# Patient Record
Sex: MALE | Race: WHITE | NOT HISPANIC OR LATINO | Employment: STUDENT | URBAN - METROPOLITAN AREA
[De-identification: names, ages, dates, MRNs, and addresses within clinical notes are randomized per-mention and may not be internally consistent; named-entity substitution may affect disease eponyms.]

---

## 2017-01-06 ENCOUNTER — OFFICE VISIT (OUTPATIENT)
Dept: FAMILY MEDICINE CLINIC | Facility: CLINIC | Age: 22
End: 2017-01-06

## 2017-01-06 VITALS
WEIGHT: 148 LBS | DIASTOLIC BLOOD PRESSURE: 82 MMHG | TEMPERATURE: 99 F | BODY MASS INDEX: 20.72 KG/M2 | HEART RATE: 95 BPM | OXYGEN SATURATION: 96 % | SYSTOLIC BLOOD PRESSURE: 122 MMHG | HEIGHT: 71 IN

## 2017-01-06 DIAGNOSIS — K21.9 GASTROESOPHAGEAL REFLUX DISEASE, ESOPHAGITIS PRESENCE NOT SPECIFIED: Primary | ICD-10-CM

## 2017-01-06 PROCEDURE — 99203 OFFICE O/P NEW LOW 30 MIN: CPT | Performed by: NURSE PRACTITIONER

## 2017-01-06 RX ORDER — OMEPRAZOLE 40 MG/1
40 CAPSULE, DELAYED RELEASE ORAL DAILY
COMMUNITY
End: 2017-01-06

## 2017-01-06 RX ORDER — OMEPRAZOLE 40 MG/1
40 CAPSULE, DELAYED RELEASE ORAL DAILY
Qty: 30 CAPSULE | Refills: 11 | Status: SHIPPED | OUTPATIENT
Start: 2017-01-06 | End: 2018-08-14

## 2017-01-06 NOTE — PROGRESS NOTES
Subjective   Salvatore Breen is a 21 y.o. male.     History of Present Illness   Salvatore Breen 21 y.o. male who presents today for a new patient appointment.    he has a history of   Patient Active Problem List   Diagnosis   • Gastroesophageal reflux disease   .  I reviewed the PFSH recorded today by my MA/LPN staff.   he is here to establish care and discuss heartburn.  Patient states he started to have heartburn specifically after meals about 4-5 months ago. He denies having any issue prior to this.  He denies abdominal pain or change in stool.  He reports symptoms are worse after certain types of food. He has been taking OTC prilosec with only some help with symptoms. States the symptoms are more mild but not controlled.  Will increase dose to 40 mg daily.  He will f/u if symptoms worsen or do not improve on increased dose.  Will then switch and refer to GI.      Reports FH of HTN, diabetes, thyroid disease.  Patient is nonsmoker. No surgical hx.  Patient reports he normally eats pretty healthy except for recently.  He attends college in [place] and is only home for break.    The following portions of the patient's history were reviewed and updated as appropriate: allergies, current medications, past family history, past medical history, past social history, past surgical history and problem list.    Review of Systems   Constitutional: Negative for unexpected weight change.   Gastrointestinal: Negative for abdominal distention, abdominal pain, anal bleeding, blood in stool, constipation, diarrhea, nausea, rectal pain and vomiting.       Objective   Physical Exam   Constitutional: He is oriented to person, place, and time. He appears well-developed and well-nourished.   HENT:   Head: Normocephalic and atraumatic.   Cardiovascular: Normal rate and regular rhythm.    Pulmonary/Chest: Effort normal and breath sounds normal.   Abdominal: Normal appearance and bowel sounds are normal. There is no tenderness. There  is no rigidity, no rebound, no guarding, no tenderness at McBurney's point and negative Neves's sign.   Neurological: He is alert and oriented to person, place, and time.   Skin: Skin is warm and dry.   Psychiatric: He has a normal mood and affect. Judgment normal.   Vitals reviewed.      Assessment/Plan   Salvatore was seen today for heartburn.    Diagnoses and all orders for this visit:    Gastroesophageal reflux disease, esophagitis presence not specified  -     omeprazole (priLOSEC) 40 MG capsule; Take 1 capsule by mouth Daily.

## 2017-01-06 NOTE — MR AVS SNAPSHOT
"                        Salvatorejuanis Gomezley   2017 11:00 AM   Office Visit    Provider:  CANDI Benavides   Department:  [facility]   Dept Phone:  [phone]                Your Full Care Plan              Your Updated Medication List          This list is accurate as of: 17 11:22 AM.  Always use your most recent med list.                omeprazole 40 MG capsule   Commonly known as:  priLOSEC               Instructions     None    Patient Instructions History      [employer] Signup     [facility] [employer] allows you to send messages to your doctor, view your test results, renew your prescriptions, schedule appointments, and more. To sign up, go to [employer] and click on the Sign Up Now link in the New User? box. Enter your [employer] Activation Code exactly as it appears below along with the last four digits of your Social Security Number and your Date of Birth () to complete the sign-up process. If you do not sign up before the expiration date, you must request a new code.    Tra[employer] Activation Code: [email or web address]  Expires: 2017 11:22 AM    If you have questions, you can email [email or web address] or call [phone] to talk to our [employer] staff. Remember, [employer] is NOT to be used for urgent needs. For medical emergencies, dial 911.               Other Info from Your Visit           Allergies     No Known Allergies      Reason for Visit     Heartburn establish care,  after meals, 4-5 mo      Vital Signs     Blood Pressure Pulse Temperature Height Weight Oxygen Saturation    122/82 95 99 °F (37.2 °C) 71\" (180.3 cm) 148 lb (67.1 kg) 96%    Body Mass Index Smoking Status                20.64 kg/m2 Never Smoker          "

## 2018-08-14 ENCOUNTER — OFFICE VISIT (OUTPATIENT)
Dept: FAMILY MEDICINE CLINIC | Facility: CLINIC | Age: 23
End: 2018-08-14

## 2018-08-14 VITALS
TEMPERATURE: 98.3 F | HEIGHT: 71 IN | RESPIRATION RATE: 16 BRPM | DIASTOLIC BLOOD PRESSURE: 82 MMHG | WEIGHT: 152 LBS | OXYGEN SATURATION: 98 % | BODY MASS INDEX: 21.28 KG/M2 | SYSTOLIC BLOOD PRESSURE: 124 MMHG | HEART RATE: 74 BPM

## 2018-08-14 DIAGNOSIS — Z00.00 WELLNESS EXAMINATION: Primary | ICD-10-CM

## 2018-08-14 PROBLEM — F41.9 ANXIETY: Status: ACTIVE | Noted: 2018-08-14

## 2018-08-14 PROBLEM — F32.A DEPRESSION: Status: ACTIVE | Noted: 2018-08-14

## 2018-08-14 PROCEDURE — 99395 PREV VISIT EST AGE 18-39: CPT | Performed by: NURSE PRACTITIONER

## 2018-08-14 RX ORDER — DULOXETIN HYDROCHLORIDE 30 MG/1
CAPSULE, DELAYED RELEASE ORAL
COMMUNITY
Start: 2018-08-13 | End: 2019-02-27

## 2018-08-14 RX ORDER — DULOXETIN HYDROCHLORIDE 60 MG/1
CAPSULE, DELAYED RELEASE ORAL
COMMUNITY
Start: 2018-08-13 | End: 2019-02-27

## 2018-08-14 NOTE — PROGRESS NOTES
Subjective   Salvatore Breen is a 23 y.o. male.     History of Present Illness   Salvatore Breen 23 y.o. male who presents for a Wellness Visit.  he has a history of   Patient Active Problem List   Diagnosis   • Gastroesophageal reflux disease   • Anxiety   • Depression   .  he has been feeling well.        Due for labs and has been fasting today.  Denies any interim issues.  Is seeing psychiatry for treatment of anxiety and depression.  He has been taking 90 mg of Cymbalta for over a year.  He reports some stomach ache in the mornings and trouble sleeping.  He takes medication at night.      The following portions of the patient's history were reviewed and updated as appropriate: allergies, current medications, past family history, past medical history, past social history, past surgical history and problem list.    Review of Systems   Constitutional: Negative for fatigue.   HENT: Negative for congestion.    Eyes: Negative for visual disturbance.   Respiratory: Negative for cough and shortness of breath.    Cardiovascular: Negative for chest pain and palpitations.   Endocrine: Negative for cold intolerance, heat intolerance, polydipsia, polyphagia and polyuria.   Genitourinary: Negative for difficulty urinating.   Musculoskeletal: Negative for back pain.   Skin: Negative for rash.   Neurological: Negative for dizziness and headaches.   Psychiatric/Behavioral: Negative for dysphoric mood and sleep disturbance. The patient is not nervous/anxious.        Objective   Physical Exam   Constitutional: He is oriented to person, place, and time. He appears well-developed and well-nourished.   HENT:   Right Ear: Tympanic membrane, external ear and ear canal normal.   Left Ear: Tympanic membrane, external ear and ear canal normal.   Nose: No mucosal edema or rhinorrhea. Right sinus exhibits no maxillary sinus tenderness and no frontal sinus tenderness. Left sinus exhibits no maxillary sinus tenderness and no frontal sinus  tenderness.   Mouth/Throat: Uvula is midline and oropharynx is clear and moist.   Cardiovascular: Normal rate and regular rhythm.    Pulmonary/Chest: Effort normal and breath sounds normal.   Neurological: He is oriented to person, place, and time.   Skin: Skin is warm and dry.   Psychiatric: He has a normal mood and affect. His behavior is normal. Judgment and thought content normal.   Nursing note and vitals reviewed.      Assessment/Plan   Salvatore was seen today for general wellness exam.    Diagnoses and all orders for this visit:    Wellness examination  -     Comprehensive metabolic panel  -     Lipid panel  -     CBC and Differential  -     TSH      Patient to switch cymbalta to mornings and take with breakfast. He will f/u with psych if still has issues.

## 2018-08-15 LAB
ALBUMIN SERPL-MCNC: 4.7 G/DL (ref 3.5–5.2)
ALBUMIN/GLOB SERPL: 2.1 G/DL
ALP SERPL-CCNC: 55 U/L (ref 39–117)
ALT SERPL-CCNC: 35 U/L (ref 1–41)
AST SERPL-CCNC: 20 U/L (ref 1–40)
BASOPHILS # BLD AUTO: 0.05 10*3/MM3 (ref 0–0.2)
BASOPHILS NFR BLD AUTO: 0.7 % (ref 0–1.5)
BILIRUB SERPL-MCNC: 0.3 MG/DL (ref 0.1–1.2)
BUN SERPL-MCNC: 9 MG/DL (ref 6–20)
BUN/CREAT SERPL: 9.8 (ref 7–25)
CALCIUM SERPL-MCNC: 9.7 MG/DL (ref 8.6–10.5)
CHLORIDE SERPL-SCNC: 102 MMOL/L (ref 98–107)
CHOLEST SERPL-MCNC: 198 MG/DL (ref 0–200)
CO2 SERPL-SCNC: 28.3 MMOL/L (ref 22–29)
CREAT SERPL-MCNC: 0.92 MG/DL (ref 0.76–1.27)
EOSINOPHIL # BLD AUTO: 0.23 10*3/MM3 (ref 0–0.7)
EOSINOPHIL NFR BLD AUTO: 3.2 % (ref 0.3–6.2)
ERYTHROCYTE [DISTWIDTH] IN BLOOD BY AUTOMATED COUNT: 13.3 % (ref 11.5–14.5)
GLOBULIN SER CALC-MCNC: 2.2 GM/DL
GLUCOSE SERPL-MCNC: 93 MG/DL (ref 65–99)
HCT VFR BLD AUTO: 48.1 % (ref 40.4–52.2)
HDLC SERPL-MCNC: 64 MG/DL (ref 40–60)
HGB BLD-MCNC: 14.9 G/DL (ref 13.7–17.6)
IMM GRANULOCYTES # BLD: 0.06 10*3/MM3 (ref 0–0.03)
IMM GRANULOCYTES NFR BLD: 0.8 % (ref 0–0.5)
LDLC SERPL CALC-MCNC: 107 MG/DL (ref 0–100)
LYMPHOCYTES # BLD AUTO: 1.79 10*3/MM3 (ref 0.9–4.8)
LYMPHOCYTES NFR BLD AUTO: 24.8 % (ref 19.6–45.3)
MCH RBC QN AUTO: 30.7 PG (ref 27–32.7)
MCHC RBC AUTO-ENTMCNC: 31 G/DL (ref 32.6–36.4)
MCV RBC AUTO: 99.2 FL (ref 79.8–96.2)
MONOCYTES # BLD AUTO: 0.55 10*3/MM3 (ref 0.2–1.2)
MONOCYTES NFR BLD AUTO: 7.6 % (ref 5–12)
NEUTROPHILS # BLD AUTO: 4.54 10*3/MM3 (ref 1.9–8.1)
NEUTROPHILS NFR BLD AUTO: 62.9 % (ref 42.7–76)
PLATELET # BLD AUTO: 256 10*3/MM3 (ref 140–500)
POTASSIUM SERPL-SCNC: 4.6 MMOL/L (ref 3.5–5.2)
PROT SERPL-MCNC: 6.9 G/DL (ref 6–8.5)
RBC # BLD AUTO: 4.85 10*6/MM3 (ref 4.6–6)
SODIUM SERPL-SCNC: 143 MMOL/L (ref 136–145)
TRIGL SERPL-MCNC: 136 MG/DL (ref 0–150)
TSH SERPL DL<=0.005 MIU/L-ACNC: 1.54 MIU/ML (ref 0.27–4.2)
VLDLC SERPL CALC-MCNC: 27.2 MG/DL (ref 5–40)
WBC # BLD AUTO: 7.22 10*3/MM3 (ref 4.5–10.7)

## 2018-10-08 ENCOUNTER — OFFICE VISIT (OUTPATIENT)
Dept: FAMILY MEDICINE CLINIC | Facility: CLINIC | Age: 23
End: 2018-10-08

## 2018-10-08 VITALS
SYSTOLIC BLOOD PRESSURE: 134 MMHG | HEIGHT: 71 IN | WEIGHT: 159 LBS | BODY MASS INDEX: 22.26 KG/M2 | DIASTOLIC BLOOD PRESSURE: 81 MMHG | TEMPERATURE: 98 F | HEART RATE: 94 BPM | RESPIRATION RATE: 16 BRPM | OXYGEN SATURATION: 98 %

## 2018-10-08 DIAGNOSIS — K29.00 OTHER ACUTE GASTRITIS WITHOUT HEMORRHAGE: Primary | ICD-10-CM

## 2018-10-08 PROCEDURE — 99213 OFFICE O/P EST LOW 20 MIN: CPT | Performed by: NURSE PRACTITIONER

## 2018-10-08 RX ORDER — OMEPRAZOLE 40 MG/1
40 CAPSULE, DELAYED RELEASE ORAL DAILY
Qty: 30 CAPSULE | Refills: 5 | Status: SHIPPED | OUTPATIENT
Start: 2018-10-08 | End: 2018-10-11

## 2018-10-08 NOTE — PROGRESS NOTES
Subjective   Salvatore Breen is a 23 y.o. male.     History of Present Illness   Salvatore Breen 23 y.o. male who presents for evaluation of nausea and abdominal pain. Symptoms have been present for a few weeks .  The condition is aggravated by nothing . he is experiencing GERD.  Alleviating factors are mylanta with temporary relief . Patient denies fever, chills, diarrhea, constipation, change in stools, GI symptoms waking them up, dyschezia, melena, bright red blood in stool, fatty food intolerance, dysphagia, vomiting, recently taking antibiotics, RUQ abdominal pain and pain referring to the back his past medical history is notable for GERD.  Patient denies recent travel.        The following portions of the patient's history were reviewed and updated as appropriate: allergies, current medications, past family history, past medical history, past social history, past surgical history and problem list.    Review of Systems   Constitutional: Negative for chills and fever.   Gastrointestinal: Positive for abdominal pain and nausea. Negative for abdominal distention, anal bleeding, blood in stool, constipation, diarrhea, rectal pain and vomiting.   Genitourinary: Negative for dysuria, frequency and hematuria.   Musculoskeletal: Negative for back pain.       Objective   Physical Exam   Constitutional: He is oriented to person, place, and time. He appears well-developed and well-nourished.   Cardiovascular: Normal rate and regular rhythm.    Pulmonary/Chest: Effort normal and breath sounds normal.   Abdominal: Normal appearance and bowel sounds are normal. There is tenderness in the epigastric area.       Neurological: He is oriented to person, place, and time.   Skin: Skin is warm and dry.   Psychiatric: He has a normal mood and affect. His behavior is normal. Judgment and thought content normal.   Nursing note and vitals reviewed.      Assessment/Plan   Salvatore was seen today for gi problem.    Diagnoses and all orders  for this visit:    Other acute gastritis without hemorrhage  -     omeprazole (PRILOSEC) 40 MG capsule; Take 1 capsule by mouth Daily.        Discussed treatment with PPI for 8 weeks. Avoid NSAIDs and spicy or fatty foods.

## 2018-10-09 ENCOUNTER — TELEPHONE (OUTPATIENT)
Dept: FAMILY MEDICINE CLINIC | Facility: CLINIC | Age: 23
End: 2018-10-09

## 2018-10-09 DIAGNOSIS — K21.9 GASTROESOPHAGEAL REFLUX DISEASE, ESOPHAGITIS PRESENCE NOT SPECIFIED: Primary | ICD-10-CM

## 2018-10-11 RX ORDER — PANTOPRAZOLE SODIUM 40 MG/1
40 TABLET, DELAYED RELEASE ORAL DAILY
Qty: 90 TABLET | Refills: 0 | Status: SHIPPED | OUTPATIENT
Start: 2018-10-11 | End: 2019-02-22 | Stop reason: SDUPTHER

## 2018-10-16 ENCOUNTER — OFFICE VISIT (OUTPATIENT)
Dept: GASTROENTEROLOGY | Facility: CLINIC | Age: 23
End: 2018-10-16

## 2018-10-16 VITALS
TEMPERATURE: 99.8 F | BODY MASS INDEX: 22.34 KG/M2 | WEIGHT: 159.6 LBS | DIASTOLIC BLOOD PRESSURE: 66 MMHG | HEIGHT: 71 IN | SYSTOLIC BLOOD PRESSURE: 114 MMHG

## 2018-10-16 DIAGNOSIS — R10.13 EPIGASTRIC PAIN: ICD-10-CM

## 2018-10-16 DIAGNOSIS — R12 HEARTBURN: Primary | ICD-10-CM

## 2018-10-16 PROCEDURE — 99203 OFFICE O/P NEW LOW 30 MIN: CPT | Performed by: INTERNAL MEDICINE

## 2018-10-16 NOTE — PROGRESS NOTES
Chief Complaint   Patient presents with   • Heartburn   • Abdominal Pain     Salvatore Breen is a 23 y.o. male who presents with heartburn, indigestion, sour stomach.  This started a year ago.  He tried a PPI - prilosec - caused dizziness.  Better on pantoprazole - still has some issues in the morning.  He wakes up with heartburn.  He sometimes wakes up in the middle the night with symptoms.  He has never really has daytime symptoms.  He has been on Cymbalta for several months and this is really help with his anxiety and is also help with his GI symptoms.  He had the acid reflux symptoms prior to starting the Cymbalta.  He denies any weight loss, dysphagia or blood in his stool.  He notes that since he moved home his weight is actually increased which she attributes to eating more regular healthy meals.  HPI  Past Medical History:   Diagnosis Date   • GERD (gastroesophageal reflux disease)      History reviewed. No pertinent surgical history.    Current Outpatient Prescriptions:   •  DULoxetine (CYMBALTA) 30 MG capsule, , Disp: , Rfl:   •  DULoxetine (CYMBALTA) 60 MG capsule, , Disp: , Rfl:   •  pantoprazole (PROTONIX) 40 MG EC tablet, Take 1 tablet by mouth Daily., Disp: 90 tablet, Rfl: 0  No Known Allergies  Social History     Social History   • Marital status: Single     Spouse name: N/A   • Number of children: N/A   • Years of education: N/A     Occupational History   • Not on file.     Social History Main Topics   • Smoking status: Never Smoker   • Smokeless tobacco: Never Used   • Alcohol use Yes      Comment: social   • Drug use: Yes     Types: Marijuana   • Sexual activity: Not on file     Other Topics Concern   • Not on file     Social History Narrative   • No narrative on file     Family History   Problem Relation Age of Onset   • Hypertension Mother    • Diabetes Mother    • Thyroid disease Mother    • Hypertension Father    • Alcohol abuse Father    • Lung disease Maternal Grandfather    • Alcohol abuse  Paternal Grandfather    • Liver disease Maternal Aunt    • Liver cancer Maternal Aunt      Review of Systems   Constitutional: Negative for appetite change and unexpected weight change.   HENT: Negative for trouble swallowing.    Gastrointestinal: Positive for abdominal pain. Negative for nausea and vomiting.   All other systems reviewed and are negative.    Vitals:    10/16/18 1427   BP: 114/66   Temp: 99.8 °F (37.7 °C)     1    10/16/18  1427   Weight: 72.4 kg (159 lb 9.6 oz)     Physical Exam   Constitutional: He appears well-developed and well-nourished.   HENT:   Head: Normocephalic and atraumatic.   Eyes: No scleral icterus.   Abdominal: Soft. He exhibits no distension and no mass. There is tenderness.   Epigastric tenderness   Neurological: He is alert.   Skin: Skin is warm and dry.   Psychiatric: He has a normal mood and affect.     No images are attached to the encounter.  No notes on file  Salvatore was seen today for heartburn and abdominal pain.    Diagnoses and all orders for this visit:    Heartburn    Epigastric pain    Plan-  Recommend changing his pantoprazole to evening dosing-take 30 minutes prior to the evening meal    We discussed like*modification to decrease nighttime heartburn symptoms including watching meal size, keeping her evening meal 3-4 hours from bedtime.      Okay to use when necessary H2 blockers for breakthrough symptoms    I do not think his Cymbalta is causing his symptoms as they seem to have preceded the use of this medicine-seems to be helping with his anxiety and overall he feels better with the medicine.    He does have some mild epigastric pain on exam.  If this persists at follow-up we will evaluate with EGD

## 2019-01-10 ENCOUNTER — OFFICE VISIT (OUTPATIENT)
Dept: GASTROENTEROLOGY | Facility: CLINIC | Age: 24
End: 2019-01-10

## 2019-01-10 VITALS
WEIGHT: 171.4 LBS | HEIGHT: 70 IN | TEMPERATURE: 98.1 F | SYSTOLIC BLOOD PRESSURE: 126 MMHG | DIASTOLIC BLOOD PRESSURE: 90 MMHG | BODY MASS INDEX: 24.54 KG/M2

## 2019-01-10 DIAGNOSIS — R10.13 EPIGASTRIC PAIN: ICD-10-CM

## 2019-01-10 DIAGNOSIS — K21.9 GASTROESOPHAGEAL REFLUX DISEASE, ESOPHAGITIS PRESENCE NOT SPECIFIED: Primary | ICD-10-CM

## 2019-01-10 PROCEDURE — 99214 OFFICE O/P EST MOD 30 MIN: CPT | Performed by: NURSE PRACTITIONER

## 2019-01-10 RX ORDER — BUPROPION HCL 150 MG
TABLET, EXTENDED RELEASE 24 HR ORAL
Refills: 0 | COMMUNITY
Start: 2018-12-29 | End: 2019-05-23 | Stop reason: ALTCHOICE

## 2019-01-10 RX ORDER — FLUOXETINE 20 MG/1
TABLET, FILM COATED ORAL
Refills: 1 | COMMUNITY
Start: 2019-01-02 | End: 2019-06-05 | Stop reason: SDUPTHER

## 2019-01-10 NOTE — PROGRESS NOTES
Chief Complaint   Patient presents with   • Follow-up   • Heartburn       Salvatore Brene is a  23 y.o. male here for a follow up visit for  GERD.    HPI  24 yo m presents today for follow up visit for GERD. He is a patient of Dr. Kingston. He was last seen in the office on 10/2018. He has hx GERD with epigastric pain and admits the protonix 40 mg PM and Zantac OTC in AM is helping his reflux symptoms. He would like to have an EGD done to see why he is requiring these meds. He denies any dysphagia, N&V,diarrhea, constipation, rectal bleeding or melena. He admits his appetite is good and his weight is stable. He has never had an EGD done in the past.       Past Medical History:   Diagnosis Date   • GERD (gastroesophageal reflux disease)        History reviewed. No pertinent surgical history.    Scheduled Meds:    Continuous Infusions:  No current facility-administered medications for this visit.     PRN Meds:.    No Known Allergies    Social History     Socioeconomic History   • Marital status: Single     Spouse name: Not on file   • Number of children: Not on file   • Years of education: Not on file   • Highest education level: Not on file   Social Needs   • Financial resource strain: Not on file   • Food insecurity - worry: Not on file   • Food insecurity - inability: Not on file   • Transportation needs - medical: Not on file   • Transportation needs - non-medical: Not on file   Occupational History   • Not on file   Tobacco Use   • Smoking status: Never Smoker   • Smokeless tobacco: Never Used   Substance and Sexual Activity   • Alcohol use: Yes     Comment: social   • Drug use: Yes     Types: Marijuana     Comment: social    • Sexual activity: Not on file   Other Topics Concern   • Not on file   Social History Narrative   • Not on file       Family History   Problem Relation Age of Onset   • Hypertension Mother    • Diabetes Mother    • Thyroid disease Mother    • Hypertension Father    • Alcohol abuse Father    •  Lung disease Maternal Grandfather    • Alcohol abuse Paternal Grandfather    • Liver disease Maternal Aunt    • Liver cancer Maternal Aunt        Review of Systems   Constitutional: Negative for appetite change, chills, diaphoresis, fatigue, fever and unexpected weight change.   HENT: Negative for nosebleeds, postnasal drip, sore throat, trouble swallowing and voice change.    Respiratory: Negative for cough, choking, chest tightness, shortness of breath and wheezing.    Cardiovascular: Negative for chest pain.   Gastrointestinal: Negative for abdominal distention, abdominal pain, anal bleeding, blood in stool, constipation, diarrhea, nausea, rectal pain and vomiting.   Endocrine: Negative for polydipsia, polyphagia and polyuria.   Musculoskeletal: Negative for gait problem.   Skin: Negative for rash and wound.   Allergic/Immunologic: Negative for food allergies.   Neurological: Negative for dizziness, speech difficulty and light-headedness.   Psychiatric/Behavioral: Negative for confusion, self-injury, sleep disturbance and suicidal ideas.       Vitals:    01/10/19 1448   BP: 126/90   Temp: 98.1 °F (36.7 °C)       Physical Exam   Constitutional: He is oriented to person, place, and time. He appears well-developed and well-nourished. He does not appear ill. No distress.   HENT:   Head: Normocephalic.   Eyes: Pupils are equal, round, and reactive to light.   Cardiovascular: Normal rate, regular rhythm and normal heart sounds.   Pulmonary/Chest: Effort normal and breath sounds normal.   Abdominal: Soft. Bowel sounds are normal. He exhibits no distension and no mass. There is no hepatosplenomegaly. There is no tenderness. There is no rebound and no guarding. No hernia.   Musculoskeletal: Normal range of motion.   Neurological: He is alert and oriented to person, place, and time.   Skin: Skin is warm and dry.   Psychiatric: He has a normal mood and affect. His speech is normal and behavior is normal. Judgment normal.        No images are attached to the encounter.    Assessment & Plan     1. Gastroesophageal reflux disease, esophagitis presence not specified  - Case Request; Standing  - Case Request    2. Epigastric pain  - Case Request; Standing  - Case Request    GERD is much improved with Protonix and Zantac but not 100%. Given hx will go ahead and recommend EGD with Dr. Kingston for further evaluation. Patient is agreeable. Continue same meds for now. Call office with any issues.

## 2019-01-25 ENCOUNTER — OUTSIDE FACILITY SERVICE (OUTPATIENT)
Dept: GASTROENTEROLOGY | Facility: CLINIC | Age: 24
End: 2019-01-25

## 2019-01-25 PROCEDURE — 43239 EGD BIOPSY SINGLE/MULTIPLE: CPT | Performed by: INTERNAL MEDICINE

## 2019-02-04 ENCOUNTER — TELEPHONE (OUTPATIENT)
Dept: GASTROENTEROLOGY | Facility: CLINIC | Age: 24
End: 2019-02-04

## 2019-02-04 NOTE — TELEPHONE ENCOUNTER
Mild chronic inflammation was seen on gastric biopsies.  No additional medication as needed for this.  Continue diet modification to prevent acid reflux and current medications.  Follow up in the office as scheduled.

## 2019-02-06 NOTE — TELEPHONE ENCOUNTER
Called pt and advised per Dr Kingston that he had mild chronic inflammation was seen on gastric bx.  No additions medication as need for this.  Continue diet modification  To prevent acid reflux and current meds.  F/u as scheduled. Pt verb undestanding.

## 2019-02-21 DIAGNOSIS — K21.9 GASTROESOPHAGEAL REFLUX DISEASE, ESOPHAGITIS PRESENCE NOT SPECIFIED: ICD-10-CM

## 2019-02-22 DIAGNOSIS — K21.9 GASTROESOPHAGEAL REFLUX DISEASE, ESOPHAGITIS PRESENCE NOT SPECIFIED: ICD-10-CM

## 2019-02-22 RX ORDER — PANTOPRAZOLE SODIUM 40 MG/1
40 TABLET, DELAYED RELEASE ORAL DAILY
Qty: 30 TABLET | Refills: 0 | Status: SHIPPED | OUTPATIENT
Start: 2019-02-22 | End: 2019-02-27 | Stop reason: SDUPTHER

## 2019-02-22 RX ORDER — PANTOPRAZOLE SODIUM 40 MG/1
40 TABLET, DELAYED RELEASE ORAL DAILY
Qty: 90 TABLET | Refills: 0 | OUTPATIENT
Start: 2019-02-22

## 2019-02-25 RX ORDER — PANTOPRAZOLE SODIUM 40 MG/1
40 TABLET, DELAYED RELEASE ORAL DAILY
Qty: 90 TABLET | Refills: 0 | OUTPATIENT
Start: 2019-02-25

## 2019-02-27 ENCOUNTER — OFFICE VISIT (OUTPATIENT)
Dept: FAMILY MEDICINE CLINIC | Facility: CLINIC | Age: 24
End: 2019-02-27

## 2019-02-27 VITALS
SYSTOLIC BLOOD PRESSURE: 133 MMHG | DIASTOLIC BLOOD PRESSURE: 75 MMHG | TEMPERATURE: 98.2 F | HEART RATE: 71 BPM | RESPIRATION RATE: 16 BRPM | OXYGEN SATURATION: 99 % | WEIGHT: 175 LBS | BODY MASS INDEX: 25.05 KG/M2 | HEIGHT: 70 IN

## 2019-02-27 DIAGNOSIS — K21.9 GASTROESOPHAGEAL REFLUX DISEASE, ESOPHAGITIS PRESENCE NOT SPECIFIED: ICD-10-CM

## 2019-02-27 DIAGNOSIS — K29.50 CHRONIC GASTRITIS WITHOUT BLEEDING, UNSPECIFIED GASTRITIS TYPE: Primary | ICD-10-CM

## 2019-02-27 PROCEDURE — 99213 OFFICE O/P EST LOW 20 MIN: CPT | Performed by: NURSE PRACTITIONER

## 2019-02-27 RX ORDER — TRAZODONE HYDROCHLORIDE 50 MG/1
TABLET ORAL
Refills: 3 | COMMUNITY
Start: 2019-01-07 | End: 2020-07-31

## 2019-02-27 RX ORDER — PANTOPRAZOLE SODIUM 40 MG/1
40 TABLET, DELAYED RELEASE ORAL DAILY
Qty: 30 TABLET | Refills: 5 | Status: SHIPPED | OUTPATIENT
Start: 2019-02-27 | End: 2019-07-29 | Stop reason: SDUPTHER

## 2019-02-27 RX ORDER — PRAZOSIN HYDROCHLORIDE 1 MG/1
CAPSULE ORAL
Refills: 0 | COMMUNITY
Start: 2018-11-30 | End: 2019-07-29

## 2019-02-27 NOTE — PROGRESS NOTES
Subjective   Salvatore Breen is a 24 y.o. male.     History of Present Illness   Patient presents to office to f/u on EGD and gastritis.  EGD results discussed with him.  He reports symptoms have improved significantly. He is taking the pantoprazole in the evening and ranitidine in the morning.  He is avoiding offending foods as discussed.    The following portions of the patient's history were reviewed and updated as appropriate: allergies, current medications, past family history, past medical history, past social history, past surgical history and problem list.    Review of Systems   Constitutional: Negative for activity change, appetite change, fatigue and unexpected weight change.   Respiratory: Negative for cough and shortness of breath.    Cardiovascular: Negative for chest pain and palpitations.   Gastrointestinal: Negative for abdominal distention, abdominal pain, anal bleeding, blood in stool, constipation, diarrhea, nausea, rectal pain and vomiting.   Skin: Negative for rash.   Psychiatric/Behavioral: Negative for dysphoric mood and sleep disturbance. The patient is not nervous/anxious.        Objective   Physical Exam   Constitutional: He is oriented to person, place, and time. He appears well-developed and well-nourished.   Pulmonary/Chest: Effort normal.   Neurological: He is oriented to person, place, and time.   Skin: Skin is warm and dry.   Psychiatric: He has a normal mood and affect. His behavior is normal. Judgment and thought content normal.   Nursing note and vitals reviewed.      Assessment/Plan   Salvatore was seen today for follow-up.    Diagnoses and all orders for this visit:    Chronic gastritis without bleeding, unspecified gastritis type  -     pantoprazole (PROTONIX) 40 MG EC tablet; Take 1 tablet by mouth Daily.    Gastroesophageal reflux disease, esophagitis presence not specified  -     pantoprazole (PROTONIX) 40 MG EC tablet; Take 1 tablet by mouth Daily.      He will stop ranitidine  when he feels that symptoms are controlled.   He will continue pantoprazole and f/u in 6 months.  Will then wean him to 20 mg dose or change to ranitidine BID.

## 2019-03-18 ENCOUNTER — OFFICE VISIT (OUTPATIENT)
Dept: RETAIL CLINIC | Facility: CLINIC | Age: 24
End: 2019-03-18

## 2019-03-18 VITALS
OXYGEN SATURATION: 98 % | RESPIRATION RATE: 18 BRPM | DIASTOLIC BLOOD PRESSURE: 78 MMHG | SYSTOLIC BLOOD PRESSURE: 130 MMHG | HEART RATE: 76 BPM | TEMPERATURE: 97.8 F

## 2019-03-18 DIAGNOSIS — J02.9 SORE THROAT: Primary | ICD-10-CM

## 2019-03-18 LAB
EXPIRATION DATE: NORMAL
INTERNAL CONTROL: NORMAL
Lab: NORMAL
S PYO AG THROAT QL: NEGATIVE

## 2019-03-18 PROCEDURE — 99213 OFFICE O/P EST LOW 20 MIN: CPT | Performed by: NURSE PRACTITIONER

## 2019-03-18 PROCEDURE — 87880 STREP A ASSAY W/OPTIC: CPT | Performed by: NURSE PRACTITIONER

## 2019-03-18 NOTE — PROGRESS NOTES
Subjective:     Salvatore Breen is a 24 y.o.     Sore Throat    The current episode started today. There has been no fever. Associated symptoms include trouble swallowing. Pertinent negatives include no congestion, coughing, ear pain, headaches or neck pain. He has had exposure to strep. He has tried nothing for the symptoms.         The following portions of the patient's history were reviewed and updated as appropriate: allergies, current medications, past family history, past medical history, past social history, past surgical history and problem list.      Review of Systems   HENT: Positive for sore throat and trouble swallowing. Negative for congestion and ear pain.    Respiratory: Negative for cough.    Cardiovascular: Negative.    Musculoskeletal: Negative for neck pain.   Neurological: Negative for headaches.         Objective:      Physical Exam   HENT:   Head: Normocephalic and atraumatic.   Right Ear: Tympanic membrane and ear canal normal.   Left Ear: Tympanic membrane and ear canal normal.   Nose: Nose normal.   Mouth/Throat: Posterior oropharyngeal erythema present. No oropharyngeal exudate.   Cardiovascular: Normal rate, regular rhythm, S1 normal, S2 normal and normal heart sounds.   Pulmonary/Chest: Effort normal and breath sounds normal.   Lymphadenopathy:     He has cervical adenopathy (mild).   Vitals reviewed.          Diagnoses and all orders for this visit:    Sore throat  -     POC Rapid Strep A  -     Beta Strep Culture, Throat - Swab, Throat

## 2019-03-18 NOTE — PATIENT INSTRUCTIONS
Pharyngitis  Pharyngitis is redness, pain, and swelling (inflammation) of the throat (pharynx). It is a very common cause of sore throat. Pharyngitis can be caused by a bacteria, but it is usually caused by a virus. Most cases of pharyngitis get better on their own without treatment.  What are the causes?  This condition may be caused by:  · Infection by viruses (viral). Viral pharyngitis spreads from person to person (is contagious) through coughing, sneezing, and sharing of personal items or utensils such as cups, forks, spoons, and toothbrushes.  · Infection by bacteria (bacterial). Bacterial pharyngitis may be spread by touching the nose or face after coming in contact with the bacteria, or through more intimate contact, such as kissing.  · Allergies. Allergies can cause buildup of mucus in the throat (post-nasal drip), leading to inflammation and irritation. Allergies can also cause blocked nasal passages, forcing breathing through the mouth, which dries and irritates the throat.    What increases the risk?  You are more likely to develop this condition if:  · You are 5-24 years old.  · You are exposed to crowded environments such as , school, or dormitory living.  · You live in a cold climate.  · You have a weakened disease-fighting (immune) system.    What are the signs or symptoms?  Symptoms of this condition vary by the cause (viral, bacterial, or allergies) and can include:  · Sore throat.  · Fatigue.  · Low-grade fever.  · Headache.  · Joint pain and muscle aches.  · Skin rashes.  · Swollen glands in the throat (lymph nodes).  · Plaque-like film on the throat or tonsils. This is often a symptom of bacterial pharyngitis.  · Vomiting.  · Stuffy nose (nasal congestion).  · Cough.  · Red, itchy eyes (conjunctivitis).  · Loss of appetite.    How is this diagnosed?  This condition is often diagnosed based on your medical history and a physical exam. Your health care provider will ask you questions about  your illness and your symptoms. A swab of your throat may be done to check for bacteria (rapid strep test). Other lab tests may also be done, depending on the suspected cause, but these are rare.  How is this treated?  This condition usually gets better in 3-4 days without medicine. Bacterial pharyngitis may be treated with antibiotic medicines.  Follow these instructions at home:  · Take over-the-counter and prescription medicines only as told by your health care provider.  ? If you were prescribed an antibiotic medicine, take it as told by your health care provider. Do not stop taking the antibiotic even if you start to feel better.  ? Do not give children aspirin because of the association with Reye syndrome.  · Drink enough water and fluids to keep your urine clear or pale yellow.  · Get a lot of rest.  · Gargle with a salt-water mixture 3-4 times a day or as needed. To make a salt-water mixture, completely dissolve ½-1 tsp of salt in 1 cup of warm water.  · If your health care provider approves, you may use throat lozenges or sprays to soothe your throat.  Contact a health care provider if:  · You have large, tender lumps in your neck.  · You have a rash.  · You cough up green, yellow-brown, or bloody spit.  Get help right away if:  · Your neck becomes stiff.  · You drool or are unable to swallow liquids.  · You cannot drink or take medicines without vomiting.  · You have severe pain that does not go away, even after you take medicine.  · You have trouble breathing, and it is not caused by a stuffy nose.  · You have new pain and swelling in your joints such as the knees, ankles, wrists, or elbows.  Summary  · Pharyngitis is redness, pain, and swelling (inflammation) of the throat (pharynx).  · While pharyngitis can be caused by a bacteria, the most common causes are viral.  · Most cases of pharyngitis get better on their own without treatment.  · Bacterial pharyngitis is treated with antibiotic medicines.  This  information is not intended to replace advice given to you by your health care provider. Make sure you discuss any questions you have with your health care provider.  Document Released: 12/18/2006 Document Revised: 01/23/2018 Document Reviewed: 01/23/2018  [employer] Interactive Patient Education © 2019 [employer]

## 2019-03-22 ENCOUNTER — TELEPHONE (OUTPATIENT)
Dept: RETAIL CLINIC | Facility: CLINIC | Age: 24
End: 2019-03-22

## 2019-03-22 LAB — S PYO THROAT QL CULT: NEGATIVE

## 2019-03-22 NOTE — TELEPHONE ENCOUNTER
Strep culture back and negative. Attempt to reach patient by phone. No answer. LMOR to return call. Results to be conveyed when patient is reached.

## 2019-05-23 ENCOUNTER — OFFICE VISIT (OUTPATIENT)
Dept: FAMILY MEDICINE CLINIC | Facility: CLINIC | Age: 24
End: 2019-05-23

## 2019-05-23 VITALS
OXYGEN SATURATION: 99 % | RESPIRATION RATE: 16 BRPM | WEIGHT: 174 LBS | DIASTOLIC BLOOD PRESSURE: 86 MMHG | HEIGHT: 70 IN | SYSTOLIC BLOOD PRESSURE: 135 MMHG | HEART RATE: 62 BPM | BODY MASS INDEX: 24.91 KG/M2

## 2019-05-23 DIAGNOSIS — K21.9 GASTROESOPHAGEAL REFLUX DISEASE WITHOUT ESOPHAGITIS: Primary | ICD-10-CM

## 2019-05-23 PROCEDURE — 99212 OFFICE O/P EST SF 10 MIN: CPT | Performed by: NURSE PRACTITIONER

## 2019-05-23 RX ORDER — BUPROPION HYDROCHLORIDE 300 MG/1
TABLET ORAL
Refills: 2 | COMMUNITY
Start: 2019-05-08 | End: 2021-03-24 | Stop reason: DRUGHIGH

## 2019-05-23 NOTE — PROGRESS NOTES
Subjective   Salvatore Breen is a 24 y.o. male.     History of Present Illness   Patient presents to office for f/u on gastritis and GERD.  He has been taking pantoprazole since last visit but is no longer taking the ranitidine.  He states he has not had any abdominal pain, nausea or heartburn.  He feels well and will continue on medication.  He still has a few months left of medication.   The following portions of the patient's history were reviewed and updated as appropriate: allergies, current medications, past family history, past medical history, past social history, past surgical history and problem list.    Review of Systems   Constitutional: Negative for fatigue.   Respiratory: Negative for cough and shortness of breath.    Cardiovascular: Negative for chest pain and palpitations.   Gastrointestinal: Negative for abdominal distention, abdominal pain, anal bleeding, blood in stool, constipation, diarrhea, nausea, rectal pain and vomiting.   Skin: Negative for rash.   Psychiatric/Behavioral: Negative for dysphoric mood and sleep disturbance. The patient is not nervous/anxious.        Objective   Physical Exam   Constitutional: He is oriented to person, place, and time. He appears well-developed and well-nourished.   Pulmonary/Chest: Effort normal.   Neurological: He is oriented to person, place, and time.   Skin: Skin is warm and dry.   Psychiatric: He has a normal mood and affect. His behavior is normal. Judgment and thought content normal.   Nursing note and vitals reviewed.      Assessment/Plan   Salvatore was seen today for follow-up, gi problem and med refill.    Diagnoses and all orders for this visit:    Gastroesophageal reflux disease without esophagitis

## 2019-05-29 ENCOUNTER — TELEPHONE (OUTPATIENT)
Dept: ORTHOPEDIC SURGERY | Facility: CLINIC | Age: 24
End: 2019-05-29

## 2019-06-05 ENCOUNTER — OFFICE VISIT (OUTPATIENT)
Dept: ORTHOPEDIC SURGERY | Facility: CLINIC | Age: 24
End: 2019-06-05

## 2019-06-05 VITALS — HEIGHT: 70 IN | TEMPERATURE: 98.6 F | WEIGHT: 175.6 LBS | BODY MASS INDEX: 25.14 KG/M2

## 2019-06-05 DIAGNOSIS — S53.442A SPRAIN OF ULNAR COLLATERAL LIGAMENT OF LEFT ELBOW, INITIAL ENCOUNTER: ICD-10-CM

## 2019-06-05 DIAGNOSIS — M25.522 LEFT ELBOW PAIN: Primary | ICD-10-CM

## 2019-06-05 PROCEDURE — 73070 X-RAY EXAM OF ELBOW: CPT | Performed by: ORTHOPAEDIC SURGERY

## 2019-06-05 PROCEDURE — 99203 OFFICE O/P NEW LOW 30 MIN: CPT | Performed by: ORTHOPAEDIC SURGERY

## 2019-06-05 RX ORDER — FLUOXETINE HYDROCHLORIDE 20 MG/1
CAPSULE ORAL
Refills: 2 | COMMUNITY
Start: 2019-05-08 | End: 2020-07-31

## 2019-06-05 RX ORDER — IBUPROFEN 800 MG/1
800 TABLET ORAL 3 TIMES DAILY
Qty: 90 TABLET | Refills: 1 | Status: SHIPPED | OUTPATIENT
Start: 2019-06-05 | End: 2019-07-29

## 2019-06-05 NOTE — PROGRESS NOTES
"  Patient: Salvatore Breen    YOB: 1995    Medical Record Number: [id number]    Chief Complaints: Left elbow injury    History of Present Illness:     24 y.o. male patient who presents for his left elbow.  The elbow was injured on May 27.  He was on vacation and dove into a swimming pool.  He hit the bottom with both arms extended and it \"jammed\" his left arm.  He felt immediate pain and noticed swelling soon afterwards.  The pain and swelling have gotten somewhat better over the past week.  Pain is mild, constant and aching.  The pain is worse with grasping and pushing.  Aleve, ibuprofen and ice have all helped.  Localizes his pain to the medial aspect of the elbow.  Denies any prior problems with the elbow.  He does report intermittent numbness and tingling in his small and ring fingers.  Denies any current neurologic complaints.    Allergies: No Known Allergies    Home Medications:      Current Outpatient Medications:   •  buPROPion XL (WELLBUTRIN XL) 300 MG 24 hr tablet, TK 1 T PO QAM, Disp: , Rfl: 2  •  pantoprazole (PROTONIX) 40 MG EC tablet, Take 1 tablet by mouth Daily., Disp: 30 tablet, Rfl: 5  •  prazosin (MINIPRESS) 1 MG capsule, TK 2 CS PO QAM AND 1 C PO QHS, Disp: , Rfl: 0  •  traZODone (DESYREL) 50 MG tablet, TK 1 T PO QHS, Disp: , Rfl: 3  •  FLUoxetine (PROzac) 20 MG capsule, TK ONE C PO QAM, Disp: , Rfl: 2  •  ibuprofen (ADVIL,MOTRIN) 800 MG tablet, Take 1 tablet by mouth 3 (Three) Times a Day., Disp: 90 tablet, Rfl: 1    Past Medical History:   Diagnosis Date   • Acid reflux    • Anxiety    • Depression    • GERD (gastroesophageal reflux disease)        No past surgical history on file.    Social History     Occupational History   • Not on file   Tobacco Use   • Smoking status: Never Smoker   • Smokeless tobacco: Never Used   Substance and Sexual Activity   • Alcohol use: Yes     Comment: social   • Drug use: Yes     Types: Marijuana     Comment: social    • Sexual activity: Not on " "file      Social History     Social History Narrative   • Not on file       Family History   Problem Relation Age of Onset   • Hypertension Mother    • Diabetes Mother    • Thyroid disease Mother    • Hypertension Father    • Alcohol abuse Father    • Lung disease Maternal Grandfather    • Alcohol abuse Paternal Grandfather    • Liver disease Maternal Aunt    • Liver cancer Maternal Aunt        Review of Systems:      Constitutional: Denies fever, shaking or chills   Eyes: Denies change in visual acuity   HEENT: Denies nasal congestion or sore throat   Respiratory: Denies cough or shortness of breath   Cardiovascular: Denies chest pain or edema  Endocrine: Denies tremors, palpitations, intolerance of heat or cold, polyuria, polydipsia.  GI: Denies abdominal pain, nausea, vomiting, bloody stools or diarrhea  : Denies frequency, urgency, incontinence, retention, or nocturia.  Musculoskeletal: Denies numbness, tingling or loss of motor function except as above  Integument: Denies rash, lesion or ulceration   Neurologic: Denies headache or focal weakness, deficits  Heme: Denies spontaneous or excessive bleeding, epistaxis, hematuria, melena, fatigue, enlarged or tender lymph nodes.      All other pertinent positives and negatives as noted above in HPI.    Physical Exam: 24 y.o. male    Vitals:    06/05/19 1258   Temp: 98.6 °F (37 °C)   TempSrc: Temporal   Weight: 79.7 kg (175 lb 9.6 oz)   Height: 177.8 cm (70\")       General:  Patient is awake and alert.  Appears in no acute distress or discomfort.    Psych:  Affect and demeanor are appropriate.    Eyes:  Conjunctiva and sclera appear grossly normal.  Eyes track well and EOM seem to be intact.    Ears:  No gross abnormalities.  Hearing adequate for the exam.    Cardiovascular:  Regular rate and rhythm.    Lungs:  Good chest expansion.  Breathing unlabored.    Lymph:  No palpable masses or adenopathy in the left upper extremity    Extremities: The left upper extremity " is examined.  He has ecchymosis along the medial aspect of the elbow.  There is edema in this area as well.  Skin is benign.  Focal tenderness over the medial side of the elbow including, particularly the flexor pronator origin and extending down over the area of the ulnar collateral ligament.  Negative Tinel's over the cubital tunnel and ulnar nerve.  Biceps and triceps are palpably intact.  No lateral sided tenderness.  Valgus stress any milking maneuver are extremely uncomfortable for him.  His exam is guarded but I do not detect any obvious instability.  No pain with passive pronation and supination the forearm demonstrates good motion.  He lacks 10 degrees of elbow extension and can flex to about 125 degrees.  Pushing and pulling against resistance are both uncomfortable for him.  He can flex and extend his wrist with good strength.  Good , pinch, finger and thumb abduction strength.  Intact sensation.  Palpable radial pulse.         Radiology:   AP and lateral views of the left elbow are ordered and reviewed.  No comparison films are available.  It looks like there are some bony fragments medially seen on the AP view.  This appears to be consistent with an avulsion fracture.  The lateral view shows no obvious abnormality.    Assessment/Plan: Left elbow contusion and avulsion fracture, suspected ulnar collateral ligament strain     It sounds like he is also having some mild, intermittent ulnar neuritis.  I recommend conservative treatment here.  I have given him a prescription for ibuprofen to take as needed.  I have also referred him to physical therapy.  I will see him back in 1 month to reevaluate.  We discussed appropriate activity modifications and restrictions.    Reginaldo Dai MD    06/05/2019    CC to Sandra Chavez APRN

## 2019-06-18 ENCOUNTER — TREATMENT (OUTPATIENT)
Dept: PHYSICAL THERAPY | Facility: CLINIC | Age: 24
End: 2019-06-18

## 2019-06-18 DIAGNOSIS — S53.442A SPRAIN OF ULNAR COLLATERAL LIGAMENT OF LEFT ELBOW, INITIAL ENCOUNTER: ICD-10-CM

## 2019-06-18 DIAGNOSIS — M25.522 LEFT ELBOW PAIN: Primary | ICD-10-CM

## 2019-06-18 PROCEDURE — 97110 THERAPEUTIC EXERCISES: CPT | Performed by: PHYSICAL THERAPIST

## 2019-06-18 PROCEDURE — 97161 PT EVAL LOW COMPLEX 20 MIN: CPT | Performed by: PHYSICAL THERAPIST

## 2019-06-18 NOTE — PROGRESS NOTES
Physical Therapy Initial Evaluation and Plan of Care    Patient: Salvatore Breen   : 1995  Diagnosis/ICD-10 Code:  Left elbow pain [M25.522]  Referring practitioner: Reginaldo Dai MD  Past medical Hx reviewed: 2019  Subjective Evaluation    History of Present Illness  Onset date: 3 weeks ago.  Mechanism of injury: A few weeks ago I was at the beach and hyperextended my elbow but hitting the bottom of the pool. I had some swelling on the (medial) side of my elbow and some briusing and burning finger down my (4th and 5th digits). It does not ache all the time but the burning feeling has went away and it doesn't swell as much. I have not been able to golf or heavy lifting but it doesn't bother me at work. A lot of movement cause the pain so I try not to do those movements. I hope to get the pain, ROM, and mobility back and to be able to lift. I also would like to get back to rock climbing since I haven't been able to do that.        Patient Occupation: Research tech Quality of life: good    Pain  Current pain rating: 3  At best pain ratin  At worst pain ratin  Location: L elbow  Quality: dull ache and sharp  Relieving factors: ice, medications, heat and rest (ibropen)  Aggravating factors: lifting and movement  Progression: improved    Social Support  Lives in: multiple-level home  Lives with: parents    Hand dominance: right    Diagnostic Tests  X-ray: abnormal    Treatments  Previous treatment: physical therapy (Low back)  Current treatment: speech therapy  Patient Goals  Patient goals for therapy: decreased edema, increased strength and decreased pain         Objective       Tenderness     Left Elbow   Tenderness in the medial epicondyle. No tenderness in the antecubital fossa, cubital tunnel, distal biceps tendon, lateral epicondyle, olecranon process and radial head.     Left Wrist/Hand   Tenderness in the medial epicondyle. No tenderness in the distal biceps tendon, lateral epicondyle and  olecranon process.     Active Range of Motion     Left Elbow   Flexion: 145 degrees   Extension: 0 degrees     Right Elbow   Flexion: 147 degrees   Extension: Right elbow active extension: 8 degrees of hyperextension.     Additional Active Range of Motion Details  All cervical active ROM is WNL. Pt experience pain in medial aspect of L elbow with shoulder flexion and abduction.     Strength/Myotome Testing   Cervical Spine   Neck extension: 5  Neck flexion: 5    Left   Neck lateral flexion (C3): 4+    Right   Neck lateral flexion (C3): 4+    Left Shoulder     Planes of Motion   Abduction: 5     Right Shoulder     Planes of Motion   Abduction: 5     Left Elbow   Flexion: 5  Extension: 4 (painful)  Forearm supination: 5  Forearm pronation: 5    Right Elbow   Flexion: 5  Extension: 5  Forearm supination: 5  Forearm pronation: 5    Left Wrist/Hand   Wrist extension: 5  Wrist flexion: 5    Right Wrist/Hand   Wrist extension: 5  Wrist flexion: 5    Tests     Left Elbow   Positive valgus stress at 0 degrees and valgus stress at 30 degrees.   Negative varus stress at 0 degrees and varus stress at 30 degrees.     Right Elbow   Negative valgus stress at 0 degrees, valgus stress at 30 degrees, varus stress at 0 degrees and varus stress at 30 degrees.     Additional Tests Details  Pt experience pain in medial aspects of elbow     General Comments     Elbow Comments   Pt experience pain with overpressure in the medial aspect of the elbow with flexion.     Wrist/Hand Comments   strength-  R- 115 lbs  L- 100 lbs      Assessment & Plan     Assessment  Impairments: activity intolerance, impaired physical strength, lacks appropriate home exercise program, pain with function and weight-bearing intolerance  Assessment details: Pt presents to PT with signs and symptoms of an ulnar collateral ligament sprain. Pt presents with instability of the medial aspect of L elbow that requires stabilization activities. Pt strength is great but  pt has discomfort with performing activities such as lifting and carrying objects. Pt was educated about the burning sensation down to the 4th and 5th digits due to compression of the ulnar that has later decreased that was present when the injury first occurred. Pt asked if he would have to get surgery and was educated that we are going to try conservative treatment first and if all fails we could refer out. Pt was given an HEP that includes stretching and strengthening exercises for the forearm muscles. Pt would benefit from skilled PT intervention to address the illustrated deficits above.    Prognosis: good  Functional Limitations: carrying objects, lifting, pulling, pushing, uncomfortable because of pain and unable to perform repetitive tasks  Goals  Plan Goals: SHORT TERM GOALS: 4 visits  1. Pt to demo understanding and compliance with HEP  2. Pt to demonstrate increased  strength by 10 lbs for improved function and independence.   3. Pt will report < 4/10 pain to help with improvement of activities.    4.  Pt to improve forearm and wrist strength to at least 4+/5 and pain less than 5/10 with testing for improved ability to lift, carry and push >3# (U)       LONG TERM GOALS:  8 visits  1. Pt to score < 15% on DASH to demonstrate perceived improvement of function.    2. Pt to demonstrate increased  strength by 20 lbs for improved function and independence. (increased all pinch by 5 lbs)  4.  Pt to improve forearm and wrist strength to at least 5/5 and pain less than 3/10 with testing for improved ability to lift, carry and push >5# (U) x 5        Plan  Therapy options: will be seen for skilled physical therapy services  Planned modality interventions: cryotherapy, TENS, iontophoresis and ultrasound  Planned therapy interventions: manual therapy, neuromuscular re-education, soft tissue mobilization, strengthening, stretching, therapeutic activities, joint mobilization, home exercise program, functional  ROM exercises and flexibility  Frequency: 2x week  Duration in visits: 8  Treatment plan discussed with: patient        Manual Therapy:         mins  07479;  Therapeutic Exercise:   15      mins  76164;     Neuromuscular Nakita:        mins  46488;    Therapeutic Activity:          mins  60779;     Gait Training:           mins  12927;     Ultrasound:          mins  72048;    Electrical Stimulation:         mins  50180 ( );  Dry Needling          mins self-pay    Timed Treatment:   15   mins   Total Treatment:    60    mins    PT SIGNATURE:  Hansel Hollingsworth, Student PT    I have reviewed and approved all documentation provided in this note.  All treatment has been provided under the direct supervision of physical therapist.       Indio Teixeira PT   [place] License #: [id number]    DATE TREATMENT INITIATED: 6/18/2019    Initial Certification  Certification Period: 9/16/2019  I certify that the therapy services are furnished while this patient is under my care.  The services outlined above are required by this patient, and will be reviewed every 90 days.     PHYSICIAN: Reginaldo Dai MD      DATE:     Please sign and return via fax to [phone].. Thank you, [facility].

## 2019-06-20 ENCOUNTER — TREATMENT (OUTPATIENT)
Dept: PHYSICAL THERAPY | Facility: CLINIC | Age: 24
End: 2019-06-20

## 2019-06-20 DIAGNOSIS — M25.522 LEFT ELBOW PAIN: Primary | ICD-10-CM

## 2019-06-20 DIAGNOSIS — S53.442A SPRAIN OF ULNAR COLLATERAL LIGAMENT OF LEFT ELBOW, INITIAL ENCOUNTER: ICD-10-CM

## 2019-06-20 PROCEDURE — 97110 THERAPEUTIC EXERCISES: CPT | Performed by: PHYSICAL THERAPIST

## 2019-06-20 PROCEDURE — 97140 MANUAL THERAPY 1/> REGIONS: CPT | Performed by: PHYSICAL THERAPIST

## 2019-06-20 NOTE — PROGRESS NOTES
Physical Therapy Daily Progress Note  Visits:2    Subjective : Salvatore Breen reports: I feel pretty good, just a little sore from doing my exercises.     Objective:   AROM  Ulnar deviation  L- 39  degrees  R- 38 degrees    MMT   Flexor carpi ulnaris- 5   Flexor carpi radialis- 5  (No pain)  See Exercise, Manual, and Modality Logs for complete treatment.     Assessment/Plan: Pt tolerated treatment session well today. Pt reported decrease pain and edema in elbow. Pt experiences a pulling sensation in medial elbow when performing radial deviation. Next visit will introduce shoulder stability exercises.   Progress per Plan of Care and Progress strengthening /stabilization /functional activity         Manual Therapy:     8    mins  66859;  Therapeutic Exercise:     50   mins  56674;     Neuromuscular Nakita:        mins  97109;    Therapeutic Activity:          mins  65100;     Gait Training:           mins  18968;     Ultrasound:          mins  83016;    Electrical Stimulation:         mins  99508 ( );  Dry Needling          mins self-pay    Timed Treatment:  58    mins   Total Treatment:     58  mins    Hansel Hollingsworth, Student PT    I have reviewed and approved all documentation provided in this note.  All treatment has been provided under the direct supervision of physical therapist.      Indio Teixeira PT  [place] License #[id number]    Physical Therapist

## 2019-06-25 ENCOUNTER — TREATMENT (OUTPATIENT)
Dept: PHYSICAL THERAPY | Facility: CLINIC | Age: 24
End: 2019-06-25

## 2019-06-25 DIAGNOSIS — M25.522 LEFT ELBOW PAIN: Primary | ICD-10-CM

## 2019-06-25 DIAGNOSIS — S53.442A SPRAIN OF ULNAR COLLATERAL LIGAMENT OF LEFT ELBOW, INITIAL ENCOUNTER: ICD-10-CM

## 2019-06-25 PROCEDURE — 97110 THERAPEUTIC EXERCISES: CPT | Performed by: PHYSICAL THERAPIST

## 2019-06-25 NOTE — PROGRESS NOTES
Physical Therapy Daily Progress Note  Visits:3    Subjective : Salvatore Breen reports: I feel good, I don't have any problems right now. This weekend I try to swing a golf club and I felt a little pull and I stopped.     Objective:   See Exercise, Manual, and Modality Logs for complete treatment.     Assessment/Plan: Pt tolerated treatment session well today. Pt experience some discomfort in medial aspect of L elbow when fully extending when rock climbing. Will provide more triceps strengthening to help decrease pain with terminal extension. Pt asked if he could get back to lifting and was educated to start off with low weight and work up to higher weight it tolerated.     Progress per Plan of Care and Progress strengthening /stabilization /functional activity         Manual Therapy:         mins  49064;  Therapeutic Exercise:   55      mins  79847;     Neuromuscular Nakita:        mins  08462;    Therapeutic Activity:          mins  28707;     Gait Training:           mins  36676;     Ultrasound:          mins  02792;    Electrical Stimulation:         mins  96919 ( );  Dry Needling          mins self-pay    Timed Treatment:    55  mins   Total Treatment:    65    mins    Hansel Hollingsworth, Student PT    I have reviewed and approved all documentation provided in this note.  All treatment has been provided under the direct supervision of physical therapist.      Indio Teixeira, PT  [place] License #[id number]    Physical Therapist

## 2019-06-27 ENCOUNTER — TREATMENT (OUTPATIENT)
Dept: PHYSICAL THERAPY | Facility: CLINIC | Age: 24
End: 2019-06-27

## 2019-06-27 DIAGNOSIS — M25.522 LEFT ELBOW PAIN: Primary | ICD-10-CM

## 2019-06-27 PROCEDURE — 97110 THERAPEUTIC EXERCISES: CPT | Performed by: PHYSICAL THERAPIST

## 2019-06-27 NOTE — PROGRESS NOTES
Physical Therapy Daily Progress Note  Visits:4    Subjective : Salvatore Breen reports: I feel good today, but felt a little stiff this morning. I sometimes feels pain in (antecubital fossa) when I press up from my bed.     Objective:   See Exercise, Manual, and Modality Logs for complete treatment.     Assessment/Plan: Pt tolerated treatment session well. Pt reports he can feel a little pull when he tries to fully extend his elbow, so we introduced press ups in a chair to increase triceps strength. Will continue to work on strengthening activities to stability the elbow.    Progress per Plan of Care and Progress strengthening /stabilization /functional activity         Manual Therapy:    5     mins  48887;  Therapeutic Exercise:    48     mins  55076;     Neuromuscular Nakita:        mins  77407;    Therapeutic Activity:          mins  65942;     Gait Training:           mins  71337;     Ultrasound:          mins  85892;    Electrical Stimulation:         mins  15095 ( );  Dry Needling          mins self-pay    Timed Treatment:   53   mins   Total Treatment:     60   mins    Hansel Hollingsworth, Student PT    I have reviewed and approved all documentation provided in this note.  All treatment has been provided under the direct supervision of physical therapist.      Indio Teixeira PT  [place] License #[id number]    Physical Therapist

## 2019-07-05 ENCOUNTER — TREATMENT (OUTPATIENT)
Dept: PHYSICAL THERAPY | Facility: CLINIC | Age: 24
End: 2019-07-05

## 2019-07-05 DIAGNOSIS — M25.522 LEFT ELBOW PAIN: Primary | ICD-10-CM

## 2019-07-05 DIAGNOSIS — S53.442A SPRAIN OF ULNAR COLLATERAL LIGAMENT OF LEFT ELBOW, INITIAL ENCOUNTER: ICD-10-CM

## 2019-07-05 PROCEDURE — 97112 NEUROMUSCULAR REEDUCATION: CPT | Performed by: PHYSICAL THERAPIST

## 2019-07-05 PROCEDURE — 97110 THERAPEUTIC EXERCISES: CPT | Performed by: PHYSICAL THERAPIST

## 2019-07-08 ENCOUNTER — OFFICE VISIT (OUTPATIENT)
Dept: ORTHOPEDIC SURGERY | Facility: CLINIC | Age: 24
End: 2019-07-08

## 2019-07-08 VITALS — WEIGHT: 175 LBS | BODY MASS INDEX: 24.5 KG/M2 | HEIGHT: 71 IN | TEMPERATURE: 98 F

## 2019-07-08 DIAGNOSIS — M25.522 LEFT ELBOW PAIN: Primary | ICD-10-CM

## 2019-07-08 PROCEDURE — 99212 OFFICE O/P EST SF 10 MIN: CPT | Performed by: ORTHOPAEDIC SURGERY

## 2019-07-08 PROCEDURE — 73070 X-RAY EXAM OF ELBOW: CPT | Performed by: ORTHOPAEDIC SURGERY

## 2019-07-08 RX ORDER — PRAZOSIN HYDROCHLORIDE 2 MG/1
CAPSULE ORAL
Refills: 2 | COMMUNITY
Start: 2019-05-08 | End: 2021-03-24

## 2019-07-08 NOTE — PROGRESS NOTES
Chief complaint: Follow-up regarding left elbow injury    Salvatore comes in today for follow-up of his left elbow.  He says that he is tremendously better.  He still has some mild residual soreness but he says that his motion and strength are tremendously improved.  Therapy has helped immensely.    He has mild tenderness over the medial side of the elbow.  Full elbow motion.  Good strength pushing and pulling against resistance.  A milking maneuver and moving valgus stress test both cause him mild medial sided discomfort.    AP and lateral views of the left elbow are ordered and reviewed.  These are compared to previous x-rays.  The medial sided calcification and questionable avulsion fracture is again visualized.  No changes relative to previous films.    Assessment: Follow-up now 6 weeks status post left elbow contusion and ulnar collateral ligament sprain with suspected avulsion fracture    Plan: Gradually resume activity as tolerated.  Transition to a home therapy program.  Follow-up as needed.

## 2019-07-09 ENCOUNTER — TREATMENT (OUTPATIENT)
Dept: PHYSICAL THERAPY | Facility: CLINIC | Age: 24
End: 2019-07-09

## 2019-07-09 DIAGNOSIS — S53.442A SPRAIN OF ULNAR COLLATERAL LIGAMENT OF LEFT ELBOW, INITIAL ENCOUNTER: ICD-10-CM

## 2019-07-09 DIAGNOSIS — M25.522 LEFT ELBOW PAIN: Primary | ICD-10-CM

## 2019-07-09 PROCEDURE — 97110 THERAPEUTIC EXERCISES: CPT | Performed by: PHYSICAL THERAPIST

## 2019-07-09 PROCEDURE — 97112 NEUROMUSCULAR REEDUCATION: CPT | Performed by: PHYSICAL THERAPIST

## 2019-07-09 NOTE — PROGRESS NOTES
Physical Therapy Daily Progress Note  Visits:6    Subjective : Salvatore Breen reports: I went to the doctor and they did an x-ray. The doctor said that I had a major tear because they found calcium deposits that is growing because of the injury. The doctor said I do not need to have a follow up with him and that the decision of continuing therapy is up to us. The doctor said it made take 4-6 months for the pain to go away and to continue what I am doing.         Objective:   See Exercise, Manual, and Modality Logs for complete treatment.     Assessment/Plan:Pt tolerated treatment session well. We discussed doing an activities this week such as golf or rock climbing that he has not been able to do since his injury occur. We also discussed the possibility of discharging him depending on how he feels next week.   Progress per Plan of Care and Progress strengthening /stabilization /functional activity         Manual Therapy:         mins  97232;  Therapeutic Exercise:    45     mins  92355;     Neuromuscular Nakita:   8     mins  54579; K taping for elbow and plank    Therapeutic Activity:         mins  66084;     Gait Training:           mins  05081;     Ultrasound:          mins  39884;    Electrical Stimulation:         mins  30453 ( );  Dry Needling          mins self-pay    Timed Treatment:   53   mins   Total Treatment:   5 3  mins    Hansel Hollingsworth, Student PT    I have reviewed and approved all documentation provided in this note.  All treatment has been provided under the direct supervision of physical therapist.      Indio Teixeira PT  [place] License #[id number]    Physical Therapist

## 2019-07-16 ENCOUNTER — TREATMENT (OUTPATIENT)
Dept: PHYSICAL THERAPY | Facility: CLINIC | Age: 24
End: 2019-07-16

## 2019-07-16 DIAGNOSIS — S53.442A SPRAIN OF ULNAR COLLATERAL LIGAMENT OF LEFT ELBOW, INITIAL ENCOUNTER: ICD-10-CM

## 2019-07-16 DIAGNOSIS — M25.522 LEFT ELBOW PAIN: Primary | ICD-10-CM

## 2019-07-16 PROCEDURE — 97110 THERAPEUTIC EXERCISES: CPT | Performed by: PHYSICAL THERAPIST

## 2019-07-16 PROCEDURE — 97530 THERAPEUTIC ACTIVITIES: CPT | Performed by: PHYSICAL THERAPIST

## 2019-07-16 NOTE — PROGRESS NOTES
Physical Therapy Discharge Note  Visits:7    Subjective : Salvatore Breen reports: I went to swing a golf club and had no trouble     Objective:   Tenderness      Left Elbow   Tenderness in the medial epicondyle. No tenderness in the antecubital fossa, cubital tunnel, distal biceps tendon, lateral epicondyle, olecranon process and radial head.     No tenderness in medial epicondyle (discharge)     Left Wrist/Hand   Tenderness in the medial epicondyle. No tenderness in the distal biceps tendon, lateral epicondyle and olecranon process.      Active Range of Motion      Left Elbow   Flexion: 145 degrees   Extension: 0 degrees      Right Elbow   Flexion: 147 degrees   Extension: Right elbow active extension: 8 degrees of hyperextension.     Additional Active Range of Motion Details  All cervical active ROM is WNL. Pt experience pain in medial aspect of L elbow with shoulder flexion and abduction.     No pain experienced in medial aspect of L elbow with shoulder flexion and Abduction (Discharge)      Strength/Myotome Testing   Cervical Spine   Neck extension: 5  Neck flexion: 5     Left   Neck lateral flexion (C3): 4+     Right   Neck lateral flexion (C3): 4+    Left Shoulder      Planes of Motion   Abduction: 5     Right Shoulder      Planes of Motion   Abduction: 5      Left Elbow   Flexion: 5  Extension: 4 (painful). 4+ no pain (Discharge)  Forearm supination: 5. 5 (Discharge)  Forearm pronation: 5. 5 (Discharge)     Right Elbow   Flexion: 5  Extension: 5  Forearm supination: 5  Forearm pronation: 5     Left Wrist/Hand   Wrist extension: 5  Wrist flexion: 5     Right Wrist/Hand   Wrist extension: 5  Wrist flexion: 5     Tests      Left Elbow   Positive valgus stress at 0 degrees and valgus stress at 30 degrees.   Negative valgus stress at 0 degrees and valgus stress at 30 degrees (Discharge)    Negative varus stress at 0 degrees and varus stress at 30 degrees.      Right Elbow   Negative valgus stress at 0 degrees,  valgus stress at 30 degrees, varus stress at 0 degrees and varus stress at 30 degrees.     Additional Tests Details  Pt experience pain in medial aspects of elbow      General Comments      Elbow Comments   Pt experience pain with overpressure in the medial aspect of the elbow with flexion. No pain      Wrist/Hand Comments   strength-  R- 115 lbs. 120 Ibs. (Discharge)  L- 100 lbs. 110 Ibs (Discharge)      See Exercise, Manual, and Modality Logs for complete treatment.     Assessment/Plan: Pt tolerated treatment session well. Pt has met 6/7 goals and has shown significant improvements with functional task with decreased pain. Pt strength have demonstrated improvement as well. Pt has mild tenderness above the medial epicondyle on the L elbow that is due calcium deposits from the healing of the UCL. Pt was discharged with the continuum of HEP.     Goals  Plan Goals: SHORT TERM GOALS: 4 visits  1. Pt to demo understanding and compliance with HEP (MET)  2. Pt to demonstrate increased  strength by 10 lbs for improved function and independence.(MET)    3. Pt will report < 4/10 pain to help with improvement of activities. (MET)   4.  Pt to improve forearm and wrist strength to at least 4+/5 and pain less than 5/10 with testing for improved ability to lift, carry and push >3# (U)   (MET)    LONG TERM GOALS:  8 visits  1. Pt to score < 15% on DASH to demonstrate perceived improvement of function. (MET)   2. Pt to demonstrate increased  strength by 20 lbs for improved function and independence. (increased all pinch by 5 lbs) (Progressing towards goal)  3.  Pt to improve forearm and wrist strength to at least 5/5 and pain less than 3/10 with testing for improved ability to lift, carry and push >5# (U) x 5  (MET)       Other- discharged          Manual Therapy:         mins  75251;  Therapeutic Exercise:   35      mins  07275;     Neuromuscular Nkaita:        mins  14774;    Therapeutic Activity:     15     mins   57229; Test and measures     Gait Training:           mins  04756;     Ultrasound:          mins  59740;    Electrical Stimulation:         mins  34317 ( );  Dry Needling          mins self-pay    Timed Treatment:   50   mins   Total Treatment:     65   mins    Hansel Hollingsworth, Student PT    I have reviewed and approved all documentation provided in this note.  All treatment has been provided under the direct supervision of physical therapist.      Jo Ulrich, PT  [place] License #[id number]    Physical Therapist

## 2019-07-29 ENCOUNTER — OFFICE VISIT (OUTPATIENT)
Dept: FAMILY MEDICINE CLINIC | Facility: CLINIC | Age: 24
End: 2019-07-29

## 2019-07-29 VITALS
HEIGHT: 71 IN | OXYGEN SATURATION: 99 % | BODY MASS INDEX: 24.64 KG/M2 | WEIGHT: 176 LBS | HEART RATE: 68 BPM | RESPIRATION RATE: 16 BRPM | SYSTOLIC BLOOD PRESSURE: 102 MMHG | DIASTOLIC BLOOD PRESSURE: 70 MMHG | TEMPERATURE: 98.6 F

## 2019-07-29 DIAGNOSIS — K21.9 GASTROESOPHAGEAL REFLUX DISEASE, ESOPHAGITIS PRESENCE NOT SPECIFIED: ICD-10-CM

## 2019-07-29 DIAGNOSIS — K29.50 CHRONIC GASTRITIS WITHOUT BLEEDING, UNSPECIFIED GASTRITIS TYPE: ICD-10-CM

## 2019-07-29 PROCEDURE — 99213 OFFICE O/P EST LOW 20 MIN: CPT | Performed by: NURSE PRACTITIONER

## 2019-07-29 RX ORDER — PANTOPRAZOLE SODIUM 40 MG/1
40 TABLET, DELAYED RELEASE ORAL DAILY
Qty: 90 TABLET | Refills: 3 | Status: SHIPPED | OUTPATIENT
Start: 2019-07-29 | End: 2020-07-31 | Stop reason: SDUPTHER

## 2019-07-29 RX ORDER — RANITIDINE HCL 75 MG
75 TABLET ORAL 2 TIMES DAILY
COMMUNITY
End: 2020-07-31

## 2019-07-29 NOTE — PROGRESS NOTES
Subjective   Salvatore Breen is a 24 y.o. male.     History of Present Illness   Salvatore Breen 24 y.o. male who presents today for routine follow up check and medication refills.  he has a history of   Patient Active Problem List   Diagnosis   • Gastroesophageal reflux disease   • Anxiety   • Depression   .  Since the last visit, he has overall felt well.  He has GERD and is well controlled on PPI medication.  he has been compliant with current medications have reviewed them.  The patient denies medication side effects.    Results for orders placed or performed in visit on 03/18/19   Beta Strep Culture, Throat - Swab, Throat   Result Value Ref Range    Beta Strep Gp A Culture Negative    POC Rapid Strep A   Result Value Ref Range    Rapid Strep A Screen Negative Negative, VALID, INVALID, Not Performed    Internal Control Passed Passed    Lot Number [id number]     Expiration Date 4,798,028          The following portions of the patient's history were reviewed and updated as appropriate: allergies, current medications, past family history, past medical history, past social history, past surgical history and problem list.    Review of Systems    Objective   Physical Exam    Assessment/Plan   Salvatore was seen today for follow-up and heartburn.    Diagnoses and all orders for this visit:    Gastroesophageal reflux disease, esophagitis presence not specified  -     pantoprazole (PROTONIX) 40 MG EC tablet; Take 1 tablet by mouth Daily.  -     Comprehensive metabolic panel  -     Lipid panel  -     CBC and Differential  -     TSH    Chronic gastritis without bleeding, unspecified gastritis type  -     pantoprazole (PROTONIX) 40 MG EC tablet; Take 1 tablet by mouth Daily.

## 2020-07-31 ENCOUNTER — OFFICE VISIT (OUTPATIENT)
Dept: FAMILY MEDICINE CLINIC | Facility: CLINIC | Age: 25
End: 2020-07-31

## 2020-07-31 VITALS
OXYGEN SATURATION: 98 % | SYSTOLIC BLOOD PRESSURE: 120 MMHG | DIASTOLIC BLOOD PRESSURE: 76 MMHG | BODY MASS INDEX: 26.05 KG/M2 | TEMPERATURE: 97.1 F | RESPIRATION RATE: 16 BRPM | WEIGHT: 182 LBS | HEIGHT: 70 IN | HEART RATE: 87 BPM

## 2020-07-31 DIAGNOSIS — Z82.41 FAMILY HISTORY OF SUDDEN CARDIAC DEATH IN FATHER: ICD-10-CM

## 2020-07-31 DIAGNOSIS — K21.9 GASTROESOPHAGEAL REFLUX DISEASE, ESOPHAGITIS PRESENCE NOT SPECIFIED: Primary | ICD-10-CM

## 2020-07-31 DIAGNOSIS — K29.50 CHRONIC GASTRITIS WITHOUT BLEEDING, UNSPECIFIED GASTRITIS TYPE: ICD-10-CM

## 2020-07-31 PROCEDURE — 99213 OFFICE O/P EST LOW 20 MIN: CPT | Performed by: NURSE PRACTITIONER

## 2020-07-31 RX ORDER — PANTOPRAZOLE SODIUM 40 MG/1
40 TABLET, DELAYED RELEASE ORAL DAILY
Qty: 90 TABLET | Refills: 3 | Status: SHIPPED | OUTPATIENT
Start: 2020-07-31 | End: 2021-01-28 | Stop reason: SDUPTHER

## 2020-07-31 RX ORDER — LAMOTRIGINE 25 MG/1
TABLET ORAL
COMMUNITY
Start: 2020-05-26 | End: 2021-03-24 | Stop reason: DRUGHIGH

## 2020-07-31 RX ORDER — MIRTAZAPINE 15 MG/1
22.5 TABLET, FILM COATED ORAL NIGHTLY
COMMUNITY
Start: 2020-05-26 | End: 2022-01-28

## 2020-07-31 NOTE — PROGRESS NOTES
"Subjective   Salvatorejuanis Breen is a 25 y.o. male.     History of Present Illness    Since the last visit, he has overall felt well.  He has GERD controlled on PPI Rx but reports breakthrough symptoms if he misses 1 dose or even takes it late.  he has been compliant with current medications have reviewed them.  The patient denies medication side effects.  Will refill medications. /76   Pulse 87   Temp 97.1 °F (36.2 °C)   Resp 16   Ht 177.8 cm (70\")   Wt 82.6 kg (182 lb)   SpO2 98%   BMI 26.11 kg/m²     Results for orders placed or performed in visit on 19   Beta Strep Culture, Throat - Swab, Throat   Result Value Ref Range    Beta Strep Gp A Culture Negative    POC Rapid Strep A   Result Value Ref Range    Rapid Strep A Screen Negative Negative, VALID, INVALID, Not Performed    Internal Control Passed Passed    Lot Number [id number]     Expiration Date ,020        Patient is UTD with his psychiatrist and medications have not changed.     Patient requests stress test.  He denies chest pain but is concerned about his risk factors for heart disease.  His father  a year ago from MI at 56 years old.  He reports that his father lived the longest out of his siblings.  Reports extensive CAD in extended family.   The following portions of the patient's history were reviewed and updated as appropriate: allergies, current medications, past family history, past medical history, past social history, past surgical history and problem list.    Review of Systems   Constitutional: Negative for fatigue.   Respiratory: Negative for cough and shortness of breath.    Cardiovascular: Negative for chest pain and palpitations.   Skin: Negative for rash.   Psychiatric/Behavioral: Negative for dysphoric mood and sleep disturbance. The patient is not nervous/anxious.        Objective   Physical Exam   Constitutional: He is oriented to person, place, and time. He appears well-developed and well-nourished. "   Cardiovascular: Normal rate and regular rhythm.   Pulmonary/Chest: Effort normal and breath sounds normal.   Neurological: He is alert and oriented to person, place, and time.   Psychiatric: He has a normal mood and affect. His behavior is normal. Judgment and thought content normal.   Nursing note and vitals reviewed.      Assessment/Plan   Salvatore was seen today for heartburn.    Diagnoses and all orders for this visit:    Gastroesophageal reflux disease, esophagitis presence not specified  -     Ambulatory Referral to Gastroenterology  -     pantoprazole (Protonix) 40 MG EC tablet; Take 1 tablet by mouth Daily.    Chronic gastritis without bleeding, unspecified gastritis type  -     pantoprazole (Protonix) 40 MG EC tablet; Take 1 tablet by mouth Daily.  -     Comprehensive metabolic panel  -     Lipid panel  -     CBC and Differential  -     TSH    Family history of sudden cardiac death in father  -     Ambulatory Referral to Cardiology        Discussed not eating after 7 pm, including and especially caffeine, chocolate and alcohol.  Patient will also switch to taking pantoprazole in the morning.      Patient referred to cardiology to discuss appropriate time for initiation of cardiovascular screening due to FH.

## 2020-08-27 DIAGNOSIS — K21.9 GASTROESOPHAGEAL REFLUX DISEASE, ESOPHAGITIS PRESENCE NOT SPECIFIED: ICD-10-CM

## 2020-08-27 DIAGNOSIS — K29.50 CHRONIC GASTRITIS WITHOUT BLEEDING, UNSPECIFIED GASTRITIS TYPE: ICD-10-CM

## 2020-08-27 RX ORDER — PANTOPRAZOLE SODIUM 40 MG/1
40 TABLET, DELAYED RELEASE ORAL DAILY
Qty: 90 TABLET | Refills: 3 | OUTPATIENT
Start: 2020-08-27

## 2021-01-28 ENCOUNTER — OFFICE VISIT (OUTPATIENT)
Dept: FAMILY MEDICINE CLINIC | Facility: CLINIC | Age: 26
End: 2021-01-28

## 2021-01-28 VITALS
BODY MASS INDEX: 27.92 KG/M2 | OXYGEN SATURATION: 98 % | HEART RATE: 84 BPM | HEIGHT: 70 IN | SYSTOLIC BLOOD PRESSURE: 130 MMHG | WEIGHT: 195 LBS | DIASTOLIC BLOOD PRESSURE: 90 MMHG | TEMPERATURE: 97.3 F | RESPIRATION RATE: 16 BRPM

## 2021-01-28 DIAGNOSIS — K21.9 GASTROESOPHAGEAL REFLUX DISEASE: Primary | ICD-10-CM

## 2021-01-28 DIAGNOSIS — R22.32 MASS OF LEFT UPPER EXTREMITY: ICD-10-CM

## 2021-01-28 PROCEDURE — 99214 OFFICE O/P EST MOD 30 MIN: CPT | Performed by: NURSE PRACTITIONER

## 2021-01-28 RX ORDER — PANTOPRAZOLE SODIUM 40 MG/1
40 TABLET, DELAYED RELEASE ORAL DAILY
Qty: 90 TABLET | Refills: 3 | Status: SHIPPED | OUTPATIENT
Start: 2021-01-28 | End: 2022-01-28 | Stop reason: SDUPTHER

## 2021-01-29 NOTE — PROGRESS NOTES
"Subjective   Salvatore Breen is a 25 y.o. male.     History of Present Illness    Since the last visit, he has overall felt well.  He has GERD controlled on PPI Rx.  he has been compliant with current medications have reviewed them.  The patient denies medication side effects.  Will refill medications. /90   Pulse 84   Temp 97.3 °F (36.3 °C)   Resp 16   Ht 177.8 cm (70\")   Wt 88.5 kg (195 lb)   SpO2 98%   BMI 27.98 kg/m²     Results for orders placed or performed in visit on 03/18/19   Beta Strep Culture, Throat - Swab, Throat    Specimen: Throat; Swab    SWAB   Result Value Ref Range    Beta Strep Gp A Culture Negative    POC Rapid Strep A    Specimen: Swab   Result Value Ref Range    Rapid Strep A Screen Negative Negative, VALID, INVALID, Not Performed    Internal Control Passed Passed    Lot Number [id number]     Expiration Date 8,312,020        He is concerned about multiple cysts he has noted on extremities and back.  He states some have been present for several years but the newest are at least a year old.  He reports his mother has fatty tumors and he assumed that is what his were also. States only the one on his left inner elbow area is tender.  He also has 2 on right upper arm, left forearm and low back.    The following portions of the patient's history were reviewed and updated as appropriate: allergies, current medications, past family history, past medical history, past social history, past surgical history and problem list.    Review of Systems   Constitutional: Negative for fatigue.   Respiratory: Negative for cough and shortness of breath.    Cardiovascular: Negative for chest pain and palpitations.   Skin: Negative for rash.   Psychiatric/Behavioral: Negative for dysphoric mood and sleep disturbance. The patient is not nervous/anxious.        Objective   Physical Exam  Vitals signs and nursing note reviewed.   Constitutional:       Appearance: He is well-developed.   Cardiovascular:     "  Rate and Rhythm: Normal rate and regular rhythm.   Pulmonary:      Effort: Pulmonary effort is normal.      Breath sounds: Normal breath sounds.   Skin:     General: Skin is warm and dry.             Comments: Multiple firm, non fluctuant masses noted to left forearm, left medial upper arm, right upper arm and left lumbar area.  Sizes vary from 1-2 cm in size.  Left medial upper arm tender to palpation and feels more firm.    Neurological:      Mental Status: He is oriented to person, place, and time.   Psychiatric:         Mood and Affect: Mood normal.         Behavior: Behavior normal.         Thought Content: Thought content normal.         Judgment: Judgment normal.         Assessment/Plan   Diagnoses and all orders for this visit:    1. Gastroesophageal reflux disease (Primary)  -     pantoprazole (Protonix) 40 MG EC tablet; Take 1 tablet by mouth Daily.  Dispense: 90 tablet; Refill: 3  -     Comprehensive metabolic panel  -     Lipid panel  -     CBC and Differential  -     TSH    2. Mass of left upper extremity  -     US nonvascular extremity limited

## 2021-02-09 ENCOUNTER — HOSPITAL ENCOUNTER (OUTPATIENT)
Dept: ULTRASOUND IMAGING | Facility: HOSPITAL | Age: 26
Discharge: HOME | End: 2021-02-09
Admitting: NURSE PRACTITIONER

## 2021-02-09 PROCEDURE — 76882 US LMTD JT/FCL EVL NVASC XTR: CPT

## 2021-02-10 ENCOUNTER — TELEPHONE (OUTPATIENT)
Dept: FAMILY MEDICINE CLINIC | Facility: CLINIC | Age: 26
End: 2021-02-10

## 2021-02-10 NOTE — TELEPHONE ENCOUNTER
Pt called in regards to his US.  I gave him message from Sandra.  Pt states he wants to think it over and call back if he decides to see a general surgeon.  Thanks

## 2021-03-10 ENCOUNTER — TELEPHONE (OUTPATIENT)
Dept: FAMILY MEDICINE CLINIC | Facility: CLINIC | Age: 26
End: 2021-03-10

## 2021-03-10 DIAGNOSIS — D17.9 MULTIPLE LIPOMAS: Primary | ICD-10-CM

## 2021-03-10 NOTE — TELEPHONE ENCOUNTER
Caller: Salvatore Breen    Relationship: Self    Best call back number: [phone]    What orders are you requesting : GENERAL SURGEON REFERRAL     PATIENT STATES THAT THIS WAS OFFERED AND AT THE TIME HE WAS NOT SURE HE WANTED TO MOVE FORWARD WITH TREATMENT. PATIENT STATES THAT HE IS READY AND WOULD LIKE A REFERRAL

## 2021-03-24 ENCOUNTER — HOSPITAL ENCOUNTER (OUTPATIENT)
Dept: GENERAL RADIOLOGY | Facility: HOSPITAL | Age: 26
Discharge: HOME | End: 2021-03-24

## 2021-03-24 ENCOUNTER — OFFICE VISIT (OUTPATIENT)
Dept: SURGERY | Facility: CLINIC | Age: 26
End: 2021-03-24

## 2021-03-24 ENCOUNTER — ANESTHESIA EVENT (OUTPATIENT)
Dept: PERIOP | Facility: HOSPITAL | Age: 26
End: 2021-03-24

## 2021-03-24 ENCOUNTER — HOSPITAL ENCOUNTER (OUTPATIENT)
Dept: CARDIOLOGY | Facility: HOSPITAL | Age: 26
Discharge: HOME | End: 2021-03-24

## 2021-03-24 ENCOUNTER — LAB (OUTPATIENT)
Dept: LAB | Facility: HOSPITAL | Age: 26
End: 2021-03-24

## 2021-03-24 VITALS
DIASTOLIC BLOOD PRESSURE: 89 MMHG | WEIGHT: 204.8 LBS | HEIGHT: 70 IN | RESPIRATION RATE: 16 BRPM | SYSTOLIC BLOOD PRESSURE: 145 MMHG | OXYGEN SATURATION: 97 % | BODY MASS INDEX: 29.32 KG/M2 | TEMPERATURE: 97.5 F | HEART RATE: 99 BPM

## 2021-03-24 DIAGNOSIS — D17.20 LIPOMA OF UPPER EXTREMITY, UNSPECIFIED LATERALITY: ICD-10-CM

## 2021-03-24 DIAGNOSIS — D17.20 LIPOMA OF UPPER EXTREMITY, UNSPECIFIED LATERALITY: Primary | ICD-10-CM

## 2021-03-24 LAB
ALBUMIN SERPL-MCNC: 4.5 G/DL (ref 3.5–5.2)
ALBUMIN/GLOB SERPL: 1.6 G/DL
ALP SERPL-CCNC: 62 U/L (ref 39–117)
ALT SERPL W P-5'-P-CCNC: 60 U/L (ref 1–41)
ANION GAP SERPL CALCULATED.3IONS-SCNC: 10 MMOL/L (ref 5–15)
AST SERPL-CCNC: 36 U/L (ref 1–40)
BASOPHILS # BLD AUTO: 0 10*3/MM3 (ref 0–0.2)
BASOPHILS NFR BLD AUTO: 0.4 % (ref 0–1.5)
BILIRUB SERPL-MCNC: 0.3 MG/DL (ref 0–1.2)
BUN SERPL-MCNC: 9 MG/DL (ref 6–20)
BUN/CREAT SERPL: 8 (ref 7–25)
CALCIUM SPEC-SCNC: 9.4 MG/DL (ref 8.6–10.5)
CHLORIDE SERPL-SCNC: 106 MMOL/L (ref 98–107)
CO2 SERPL-SCNC: 27 MMOL/L (ref 22–29)
CREAT SERPL-MCNC: 1.12 MG/DL (ref 0.76–1.27)
DEPRECATED RDW RBC AUTO: 44.2 FL (ref 37–54)
EOSINOPHIL # BLD AUTO: 0.1 10*3/MM3 (ref 0–0.4)
EOSINOPHIL NFR BLD AUTO: 1.1 % (ref 0.3–6.2)
ERYTHROCYTE [DISTWIDTH] IN BLOOD BY AUTOMATED COUNT: 13.7 % (ref 12.3–15.4)
GFR SERPL CREATININE-BSD FRML MDRD: 80 ML/MIN/1.73
GLOBULIN UR ELPH-MCNC: 2.8 GM/DL
GLUCOSE SERPL-MCNC: 120 MG/DL (ref 65–99)
HCT VFR BLD AUTO: 44.6 % (ref 37.5–51)
HGB BLD-MCNC: 15.1 G/DL (ref 13–17.7)
LYMPHOCYTES # BLD AUTO: 2 10*3/MM3 (ref 0.7–3.1)
LYMPHOCYTES NFR BLD AUTO: 23.5 % (ref 19.6–45.3)
MCH RBC QN AUTO: 31.7 PG (ref 26.6–33)
MCHC RBC AUTO-ENTMCNC: 33.9 G/DL (ref 31.5–35.7)
MCV RBC AUTO: 93.6 FL (ref 79–97)
MONOCYTES # BLD AUTO: 0.6 10*3/MM3 (ref 0.1–0.9)
MONOCYTES NFR BLD AUTO: 7.3 % (ref 5–12)
NEUTROPHILS NFR BLD AUTO: 5.7 10*3/MM3 (ref 1.7–7)
NEUTROPHILS NFR BLD AUTO: 67.7 % (ref 42.7–76)
NRBC BLD AUTO-RTO: 0 /100 WBC (ref 0–0.2)
PLATELET # BLD AUTO: 285 10*3/MM3 (ref 140–450)
PMV BLD AUTO: 8 FL (ref 6–12)
POTASSIUM SERPL-SCNC: 4.5 MMOL/L (ref 3.5–5.2)
PROT SERPL-MCNC: 7.3 G/DL (ref 6–8.5)
RBC # BLD AUTO: 4.76 10*6/MM3 (ref 4.14–5.8)
SARS-COV-2 RNA PNL SPEC NAA+PROBE: NOT DETECTED
SODIUM SERPL-SCNC: 143 MMOL/L (ref 136–145)
WBC # BLD AUTO: 8.4 10*3/MM3 (ref 3.4–10.8)

## 2021-03-24 PROCEDURE — 85025 COMPLETE CBC W/AUTO DIFF WBC: CPT

## 2021-03-24 PROCEDURE — 93010 ELECTROCARDIOGRAM REPORT: CPT | Performed by: INTERNAL MEDICINE

## 2021-03-24 PROCEDURE — 93005 ELECTROCARDIOGRAM TRACING: CPT | Performed by: SURGERY

## 2021-03-24 PROCEDURE — 80053 COMPREHEN METABOLIC PANEL: CPT

## 2021-03-24 PROCEDURE — C9803 HOPD COVID-19 SPEC COLLECT: HCPCS

## 2021-03-24 PROCEDURE — 71046 X-RAY EXAM CHEST 2 VIEWS: CPT

## 2021-03-24 PROCEDURE — 99204 OFFICE O/P NEW MOD 45 MIN: CPT | Performed by: SURGERY

## 2021-03-24 PROCEDURE — 36415 COLL VENOUS BLD VENIPUNCTURE: CPT

## 2021-03-24 PROCEDURE — U0003 INFECTIOUS AGENT DETECTION BY NUCLEIC ACID (DNA OR RNA); SEVERE ACUTE RESPIRATORY SYNDROME CORONAVIRUS 2 (SARS-COV-2) (CORONAVIRUS DISEASE [COVID-19]), AMPLIFIED PROBE TECHNIQUE, MAKING USE OF HIGH THROUGHPUT TECHNOLOGIES AS DESCRIBED BY CMS-2020-01-R: HCPCS

## 2021-03-24 RX ORDER — FAMOTIDINE 20 MG/1
20 TABLET, FILM COATED ORAL
Status: CANCELLED | OUTPATIENT
Start: 2021-03-24

## 2021-03-24 RX ORDER — MIDAZOLAM HYDROCHLORIDE 1 MG/ML
1 INJECTION INTRAMUSCULAR; INTRAVENOUS
Status: CANCELLED | OUTPATIENT
Start: 2021-03-24

## 2021-03-24 RX ORDER — SODIUM CHLORIDE 9 MG/ML
100 INJECTION, SOLUTION INTRAVENOUS CONTINUOUS
Status: CANCELLED | OUTPATIENT
Start: 2021-03-24

## 2021-03-24 RX ORDER — CHLORHEXIDINE GLUCONATE 0.12 MG/ML
15 RINSE ORAL EVERY 12 HOURS SCHEDULED
Status: CANCELLED | OUTPATIENT
Start: 2021-03-24

## 2021-03-24 RX ORDER — ACETAMINOPHEN 500 MG
1000 TABLET ORAL ONCE
Status: CANCELLED | OUTPATIENT
Start: 2021-03-24 | End: 2021-03-24

## 2021-03-24 RX ORDER — FAMOTIDINE 10 MG/ML
20 INJECTION, SOLUTION INTRAVENOUS
Status: CANCELLED | OUTPATIENT
Start: 2021-03-24

## 2021-03-24 RX ORDER — GABAPENTIN 300 MG/1
600 CAPSULE ORAL ONCE
Status: CANCELLED | OUTPATIENT
Start: 2021-03-24 | End: 2021-03-24

## 2021-03-24 RX ORDER — SODIUM CHLORIDE 0.9 % (FLUSH) 0.9 %
10 SYRINGE (ML) INJECTION EVERY 12 HOURS SCHEDULED
Status: CANCELLED | OUTPATIENT
Start: 2021-03-24

## 2021-03-24 RX ORDER — PRAZOSIN HYDROCHLORIDE 1 MG/1
1 CAPSULE ORAL NIGHTLY
COMMUNITY

## 2021-03-24 RX ORDER — BUPROPION HYDROCHLORIDE 150 MG/1
150 TABLET ORAL EVERY MORNING
COMMUNITY
Start: 2021-03-07

## 2021-03-24 RX ORDER — METOCLOPRAMIDE HYDROCHLORIDE 5 MG/ML
10 INJECTION INTRAMUSCULAR; INTRAVENOUS ONCE AS NEEDED
Status: CANCELLED | OUTPATIENT
Start: 2021-03-24

## 2021-03-24 RX ORDER — ONDANSETRON 2 MG/ML
4 INJECTION INTRAMUSCULAR; INTRAVENOUS ONCE AS NEEDED
Status: CANCELLED | OUTPATIENT
Start: 2021-03-24

## 2021-03-24 RX ORDER — LAMOTRIGINE 150 MG/1
75 TABLET ORAL
COMMUNITY
Start: 2021-02-28

## 2021-03-24 RX ORDER — MIDAZOLAM HYDROCHLORIDE 1 MG/ML
2 INJECTION INTRAMUSCULAR; INTRAVENOUS
Status: CANCELLED | OUTPATIENT
Start: 2021-03-24

## 2021-03-24 NOTE — PROGRESS NOTES
GENERAL SURGERY CONSULTATION NOTE    Consult requested by: BLADE Chavez    Patient Care Team:  Sandra Chavez APRN as PCP - General (Family Medicine)    Reason for consult: Multiple lipomas in multiple locations over the body    Subjective     Patient is a 26 y.o. male presents with multiple lipomas which are present all over his body.  The patient states that he first noticed them popping up about 1 to 2 years ago.  Although they do not seem to be getting necessarily any larger at present, he has noticed that they are popping up in multiple locations and some of them are more painful than others.  He states that he has some lipomas on his upper extremities bilaterally and that occasionally these are painful.  Especially the ones on the inner aspect of his left upper extremity.  The patient has had a left elbow injury previously which causes him to intermittently have numbness and tingling of the left ring and pinky finger.  He is not having any numbness or tingling right now, but when the lipomas are palpated he does occasionally have discomfort with numbness, and tingling.  Brought this to the attention of his primary care physician who ordered an ultrasound of these areas.  On the right there it was a 1 x 2.3 x 2.2 cm slightly hyperechoic subcutaneous mass along with another 1.5 x 0.9 x 1.6 cm mass.  Near the left medial condyle there was a 2.2 x 0.9 x 1.7 cm similar-appearing lesion which was contained within the subcutaneous fat layer and consistent with a lipoma.     Review of Systems   Constitutional: Negative for appetite change, chills and fever.   HENT: Negative for congestion and sore throat.    Eyes: Positive for photophobia.   Respiratory: Negative for cough and shortness of breath.    Cardiovascular: Negative for chest pain and palpitations.   Gastrointestinal: Positive for GERD. Negative for abdominal pain, constipation, diarrhea, nausea and vomiting.   Genitourinary: Negative for difficulty  urinating, dysuria and frequency.   Musculoskeletal: Negative for arthralgias and back pain.   Skin: Negative for rash and skin lesions.   Neurological: Positive for light-headedness. Negative for dizziness, seizures and memory problem.   Hematological: Negative for adenopathy. Does not bruise/bleed easily.   Psychiatric/Behavioral: Positive for depressed mood. Negative for sleep disturbance. The patient is nervous/anxious.         History  Past Medical History:   Diagnosis Date   • Acid reflux    • Anxiety    • Depression    • GERD (gastroesophageal reflux disease)      History reviewed. No pertinent surgical history.  Family History   Problem Relation Age of Onset   • Hypertension Mother    • Diabetes Mother    • Thyroid disease Mother    • Hypertension Father    • Alcohol abuse Father    • Lung disease Maternal Grandfather    • Alcohol abuse Paternal Grandfather    • Liver disease Maternal Aunt    • Liver cancer Maternal Aunt      Social History     Tobacco Use   • Smoking status: Never Smoker   • Smokeless tobacco: Current User   • Tobacco comment: E-cig   Substance Use Topics   • Alcohol use: Yes     Alcohol/week: 1.0 - 2.0 standard drinks     Types: 1 - 2 Cans of beer per week     Comment: social   • Drug use: Yes     Types: Marijuana     Comment: social      (Not in a hospital admission)    Allergies:  Patient has no known allergies.    Objective     Vital Signs  Temp:  [97.5 °F (36.4 °C)] 97.5 °F (36.4 °C)  Heart Rate:  [99] 99  Resp:  [16] 16  BP: (145)/(89) 145/89    Physical Exam  Vitals reviewed.   Constitutional:       Appearance: He is well-developed.   HENT:      Head: Normocephalic and atraumatic.   Eyes:      Pupils: Pupils are equal, round, and reactive to light.   Cardiovascular:      Rate and Rhythm: Normal rate and regular rhythm.   Pulmonary:      Effort: Pulmonary effort is normal.      Breath sounds: Normal breath sounds.   Abdominal:      General: There is no distension.      Palpations:  Abdomen is soft.      Tenderness: There is no abdominal tenderness.      Hernia: No hernia is present.   Musculoskeletal:         General: Normal range of motion.      Cervical back: Normal range of motion.   Lymphadenopathy:      Cervical: No cervical adenopathy.   Skin:     General: Skin is warm and dry.      Findings: No rash.      Comments: On the patient's right upper extremity, in the upper arm there is a approximately 3 x 2 cm subcutaneous nodule which is firm, consistent with a lipoma.  In the left upper extremity, on the medial aspect of the upper arm there are 2 areas of similar size which are less than 2 cm in diameter which again appear consistent with lipomas.  Patient has scattered areas of lipomas on his left flank, and right lateral chest wall which are less than 1 cm and consistent with subcutaneous lipomas as well.   Neurological:      Mental Status: He is alert and oriented to person, place, and time.      Motor: Motor function is intact. No weakness.         Results Review:   Lab Results (last 24 hours)     ** No results found for the last 24 hours. **        No radiology results for the last day      I reviewed the patient's new imaging results and agree with the interpretation.  I reviewed the patient's other test results and agree with the interpretation    Assessment/Plan     Active Problems:  Bilateral upper extremity lipomas  Lipomas of the thoracoabdominal region    We discussed the risk, benefits, and alternatives to surgical excision.  We discussed observation as an option for monitoring these lipomas.  We discussed that lipomas are typically removed if they are enlarging, if they pose a significant cosmetic defect, or if they are causing significant discomfort.  The patient states that these on the upper extremity are causing him some discomfort and therefore he wishes to have them removed.  With regards to the ones in the thoracoabdominal region they are not necessarily uncomfortable,  they do not pose a significant cosmetic defect, and he has not noticed that they are enlarging.  Therefore, given the challenges with repositioning during the operating room, the patient has elected to forego surgical excision of these at this time and will continue to monitor them going forward.  We will proceed to the operating room for excision of bilateral upper extremity lipomas.    I discussed the patients findings and my recommendations with the patient.     Ramses Patterson MD  03/24/21  09:14 EDT       English

## 2021-03-25 ENCOUNTER — HOSPITAL ENCOUNTER (OUTPATIENT)
Facility: HOSPITAL | Age: 26
Setting detail: HOSPITAL OUTPATIENT SURGERY
Discharge: HOME | End: 2021-03-25
Attending: SURGERY | Admitting: SURGERY

## 2021-03-25 ENCOUNTER — ANESTHESIA (OUTPATIENT)
Dept: PERIOP | Facility: HOSPITAL | Age: 26
End: 2021-03-25

## 2021-03-25 VITALS
HEIGHT: 71 IN | RESPIRATION RATE: 17 BRPM | WEIGHT: 202 LBS | BODY MASS INDEX: 28.28 KG/M2 | DIASTOLIC BLOOD PRESSURE: 75 MMHG | OXYGEN SATURATION: 95 % | TEMPERATURE: 96.8 F | SYSTOLIC BLOOD PRESSURE: 128 MMHG | HEART RATE: 88 BPM

## 2021-03-25 DIAGNOSIS — D17.20 LIPOMA OF UPPER EXTREMITY, UNSPECIFIED LATERALITY: ICD-10-CM

## 2021-03-25 PROCEDURE — 25010000002 ONDANSETRON PER 1 MG: Performed by: ANESTHESIOLOGY

## 2021-03-25 PROCEDURE — 25010000002 MIDAZOLAM PER 1 MG: Performed by: ANESTHESIOLOGIST ASSISTANT

## 2021-03-25 PROCEDURE — 88304 TISSUE EXAM BY PATHOLOGIST: CPT | Performed by: SURGERY

## 2021-03-25 PROCEDURE — 25010000002 DEXAMETHASONE PER 1 MG: Performed by: ANESTHESIOLOGY

## 2021-03-25 PROCEDURE — 25010000002 PROPOFOL 10 MG/ML EMULSION: Performed by: ANESTHESIOLOGY

## 2021-03-25 PROCEDURE — 25010000002 FENTANYL CITRATE (PF) 100 MCG/2ML SOLUTION: Performed by: ANESTHESIOLOGY

## 2021-03-25 PROCEDURE — 24075 EXC ARM/ELBOW LES SC < 3 CM: CPT | Performed by: SURGERY

## 2021-03-25 RX ORDER — HYDROMORPHONE HCL 110MG/55ML
0.5 PATIENT CONTROLLED ANALGESIA SYRINGE INTRAVENOUS
Status: DISCONTINUED | OUTPATIENT
Start: 2021-03-25 | End: 2021-03-25 | Stop reason: HOSPADM

## 2021-03-25 RX ORDER — ALBUTEROL SULFATE 2.5 MG/3ML
2.5 SOLUTION RESPIRATORY (INHALATION) ONCE AS NEEDED
Status: DISCONTINUED | OUTPATIENT
Start: 2021-03-25 | End: 2021-03-25 | Stop reason: HOSPADM

## 2021-03-25 RX ORDER — MIDAZOLAM HYDROCHLORIDE 1 MG/ML
1 INJECTION INTRAMUSCULAR; INTRAVENOUS
Status: DISCONTINUED | OUTPATIENT
Start: 2021-03-25 | End: 2021-03-25 | Stop reason: HOSPADM

## 2021-03-25 RX ORDER — LIDOCAINE HYDROCHLORIDE 10 MG/ML
0.5 INJECTION, SOLUTION EPIDURAL; INFILTRATION; INTRACAUDAL; PERINEURAL ONCE AS NEEDED
Status: DISCONTINUED | OUTPATIENT
Start: 2021-03-25 | End: 2021-03-25 | Stop reason: HOSPADM

## 2021-03-25 RX ORDER — DIPHENHYDRAMINE HYDROCHLORIDE 50 MG/ML
12.5 INJECTION INTRAMUSCULAR; INTRAVENOUS
Status: DISCONTINUED | OUTPATIENT
Start: 2021-03-25 | End: 2021-03-25 | Stop reason: HOSPADM

## 2021-03-25 RX ORDER — LIDOCAINE HYDROCHLORIDE AND EPINEPHRINE 10; 10 MG/ML; UG/ML
INJECTION, SOLUTION INFILTRATION; PERINEURAL AS NEEDED
Status: DISCONTINUED | OUTPATIENT
Start: 2021-03-25 | End: 2021-03-25 | Stop reason: HOSPADM

## 2021-03-25 RX ORDER — SODIUM CHLORIDE 0.9 % (FLUSH) 0.9 %
10 SYRINGE (ML) INJECTION AS NEEDED
Status: DISCONTINUED | OUTPATIENT
Start: 2021-03-25 | End: 2021-03-25 | Stop reason: HOSPADM

## 2021-03-25 RX ORDER — PHENYLEPHRINE HCL IN 0.9% NACL 1 MG/10 ML
SYRINGE (ML) INTRAVENOUS AS NEEDED
Status: DISCONTINUED | OUTPATIENT
Start: 2021-03-25 | End: 2021-03-25 | Stop reason: SURG

## 2021-03-25 RX ORDER — MIDAZOLAM HYDROCHLORIDE 1 MG/ML
INJECTION INTRAMUSCULAR; INTRAVENOUS AS NEEDED
Status: DISCONTINUED | OUTPATIENT
Start: 2021-03-25 | End: 2021-03-25 | Stop reason: SURG

## 2021-03-25 RX ORDER — ONDANSETRON 2 MG/ML
INJECTION INTRAMUSCULAR; INTRAVENOUS AS NEEDED
Status: DISCONTINUED | OUTPATIENT
Start: 2021-03-25 | End: 2021-03-25 | Stop reason: SURG

## 2021-03-25 RX ORDER — SODIUM CHLORIDE 9 MG/ML
100 INJECTION, SOLUTION INTRAVENOUS CONTINUOUS
Status: DISCONTINUED | OUTPATIENT
Start: 2021-03-25 | End: 2021-03-25 | Stop reason: HOSPADM

## 2021-03-25 RX ORDER — ONDANSETRON 2 MG/ML
4 INJECTION INTRAMUSCULAR; INTRAVENOUS ONCE AS NEEDED
Status: DISCONTINUED | OUTPATIENT
Start: 2021-03-25 | End: 2021-03-25 | Stop reason: HOSPADM

## 2021-03-25 RX ORDER — FENTANYL CITRATE 50 UG/ML
INJECTION, SOLUTION INTRAMUSCULAR; INTRAVENOUS AS NEEDED
Status: DISCONTINUED | OUTPATIENT
Start: 2021-03-25 | End: 2021-03-25 | Stop reason: SURG

## 2021-03-25 RX ORDER — HYDROCODONE BITARTRATE AND ACETAMINOPHEN 5; 325 MG/1; MG/1
1-2 TABLET ORAL EVERY 4 HOURS PRN
Qty: 30 TABLET | Refills: 0 | Status: SHIPPED | OUTPATIENT
Start: 2021-03-25 | End: 2021-04-07

## 2021-03-25 RX ORDER — SODIUM CHLORIDE, SODIUM LACTATE, POTASSIUM CHLORIDE, CALCIUM CHLORIDE 600; 310; 30; 20 MG/100ML; MG/100ML; MG/100ML; MG/100ML
9 INJECTION, SOLUTION INTRAVENOUS CONTINUOUS PRN
Status: DISCONTINUED | OUTPATIENT
Start: 2021-03-25 | End: 2021-03-25 | Stop reason: HOSPADM

## 2021-03-25 RX ORDER — PROPOFOL 10 MG/ML
VIAL (ML) INTRAVENOUS AS NEEDED
Status: DISCONTINUED | OUTPATIENT
Start: 2021-03-25 | End: 2021-03-25 | Stop reason: SURG

## 2021-03-25 RX ORDER — NALOXONE HCL 0.4 MG/ML
0.4 VIAL (ML) INJECTION AS NEEDED
Status: DISCONTINUED | OUTPATIENT
Start: 2021-03-25 | End: 2021-03-25 | Stop reason: HOSPADM

## 2021-03-25 RX ORDER — NALBUPHINE HCL 10 MG/ML
2 AMPUL (ML) INJECTION EVERY 4 HOURS PRN
Status: DISCONTINUED | OUTPATIENT
Start: 2021-03-25 | End: 2021-03-25 | Stop reason: HOSPADM

## 2021-03-25 RX ORDER — LIDOCAINE HYDROCHLORIDE 20 MG/ML
INJECTION, SOLUTION EPIDURAL; INFILTRATION; INTRACAUDAL; PERINEURAL AS NEEDED
Status: DISCONTINUED | OUTPATIENT
Start: 2021-03-25 | End: 2021-03-25 | Stop reason: SURG

## 2021-03-25 RX ORDER — NALBUPHINE HCL 10 MG/ML
10 AMPUL (ML) INJECTION EVERY 4 HOURS PRN
Status: DISCONTINUED | OUTPATIENT
Start: 2021-03-25 | End: 2021-03-25 | Stop reason: HOSPADM

## 2021-03-25 RX ORDER — FLUMAZENIL 0.1 MG/ML
0.2 INJECTION INTRAVENOUS AS NEEDED
Status: DISCONTINUED | OUTPATIENT
Start: 2021-03-25 | End: 2021-03-25 | Stop reason: HOSPADM

## 2021-03-25 RX ORDER — LORAZEPAM 2 MG/ML
1 INJECTION INTRAMUSCULAR
Status: DISCONTINUED | OUTPATIENT
Start: 2021-03-25 | End: 2021-03-25 | Stop reason: HOSPADM

## 2021-03-25 RX ORDER — MEPERIDINE HYDROCHLORIDE 25 MG/ML
12.5 INJECTION INTRAMUSCULAR; INTRAVENOUS; SUBCUTANEOUS
Status: DISCONTINUED | OUTPATIENT
Start: 2021-03-25 | End: 2021-03-25 | Stop reason: HOSPADM

## 2021-03-25 RX ORDER — DEXAMETHASONE SODIUM PHOSPHATE 4 MG/ML
INJECTION, SOLUTION INTRA-ARTICULAR; INTRALESIONAL; INTRAMUSCULAR; INTRAVENOUS; SOFT TISSUE AS NEEDED
Status: DISCONTINUED | OUTPATIENT
Start: 2021-03-25 | End: 2021-03-25 | Stop reason: SURG

## 2021-03-25 RX ADMIN — Medication 100 MCG: at 13:44

## 2021-03-25 RX ADMIN — FENTANYL CITRATE 100 MCG: 50 INJECTION, SOLUTION INTRAMUSCULAR; INTRAVENOUS at 13:16

## 2021-03-25 RX ADMIN — LIDOCAINE HYDROCHLORIDE 100 MG: 20 INJECTION, SOLUTION EPIDURAL; INFILTRATION; INTRACAUDAL; PERINEURAL at 13:16

## 2021-03-25 RX ADMIN — SODIUM CHLORIDE, POTASSIUM CHLORIDE, SODIUM LACTATE AND CALCIUM CHLORIDE 9 ML/HR: 600; 310; 30; 20 INJECTION, SOLUTION INTRAVENOUS at 11:11

## 2021-03-25 RX ADMIN — MIDAZOLAM 2 MG: 1 INJECTION INTRAMUSCULAR; INTRAVENOUS at 13:17

## 2021-03-25 RX ADMIN — DEXAMETHASONE SODIUM PHOSPHATE 4 MG: 4 INJECTION, SOLUTION INTRAMUSCULAR; INTRAVENOUS at 13:16

## 2021-03-25 RX ADMIN — CEFAZOLIN SODIUM 2 G: 1 INJECTION, POWDER, FOR SOLUTION INTRAMUSCULAR; INTRAVENOUS at 13:16

## 2021-03-25 RX ADMIN — PROPOFOL 200 MG: 10 INJECTION, EMULSION INTRAVENOUS at 13:16

## 2021-03-25 RX ADMIN — ONDANSETRON 4 MG: 2 INJECTION INTRAMUSCULAR; INTRAVENOUS at 13:44

## 2021-03-25 RX ADMIN — SODIUM CHLORIDE: 0.9 INJECTION, SOLUTION INTRAVENOUS at 12:29

## 2021-03-25 NOTE — ANESTHESIA PREPROCEDURE EVALUATION
Anesthesia Evaluation     Patient summary reviewed and Nursing notes reviewed   NPO Solid Status: > 6 hours  NPO Liquid Status: > 6 hours           Airway   Mallampati: II  TM distance: >3 FB  Neck ROM: full  No difficulty expected  Dental - normal exam     Pulmonary - negative pulmonary ROS and normal exam    breath sounds clear to auscultation  Cardiovascular - negative cardio ROS and normal exam    ECG reviewed  Rhythm: regular  Rate: normal        Neuro/Psych  (+) psychiatric history,     GI/Hepatic/Renal/Endo    (+)  GERD,      Musculoskeletal (-) negative ROS    Abdominal  - normal exam    Abdomen: soft.  Bowel sounds: normal.   Substance History - negative use     OB/GYN negative ob/gyn ROS         Other - negative ROS                       Anesthesia Plan    ASA 3     general     intravenous induction     Anesthetic plan, all risks, benefits, and alternatives have been provided, discussed and informed consent has been obtained with: patient.  Use of blood products discussed with patient .   Plan discussed with CRNA and CAA.

## 2021-03-25 NOTE — ANESTHESIA POSTPROCEDURE EVALUATION
Patient: Salvatore Breen    Procedure Summary     Date: 03/25/21 Room / Location: [facility] OR  / [facility] MAIN OR    Anesthesia Start: 1317 Anesthesia Stop: 1358    Procedure: LIPOMA EXCISION OF BILATERAL UPPER EXTREMITIES (Bilateral ) Diagnosis:       Lipoma of upper extremity, unspecified laterality      (Lipoma of upper extremity, unspecified laterality [D17.20])    Surgeons: Ramses Patterson MD Provider: Coy Cool MD    Anesthesia Type: general ASA Status: 3          Anesthesia Type: general    Vitals  Vitals Value Taken Time   /64 03/25/21 1408   Temp 97.2 °F (36.2 °C) 03/25/21 1357   Pulse 85 03/25/21 1410   Resp 13 03/25/21 1407   SpO2 97 % 03/25/21 1410   Vitals shown include unvalidated device data.        Post Anesthesia Care and Evaluation    Patient location during evaluation: bedside  Patient participation: complete - patient participated  Level of consciousness: awake and alert  Pain score: 1  Pain management: adequate  Airway patency: patent  Anesthetic complications: No anesthetic complications  PONV Status: none  Cardiovascular status: acceptable  Respiratory status: acceptable  Hydration status: acceptable  Post Neuraxial Block status: Motor and sensory function returned to baseline

## 2021-03-25 NOTE — ANESTHESIA PROCEDURE NOTES
Airway  Urgency: elective    Date/Time: 3/25/2021 1:18 PM  Airway not difficult    General Information and Staff    Patient location during procedure: OR    Indications and Patient Condition  Indications for airway management: airway protection    Preoxygenated: yes  Mask difficulty assessment: 0 - not attempted    Final Airway Details  Final airway type: supraglottic airway      Successful airway: LMA  Size 4    Number of attempts at approach: 1  Assessment: lips, teeth, and gum same as pre-op    Additional Comments  LMA placed by GAYATHRI Hunter

## 2021-03-28 LAB — QT INTERVAL: 312 MS

## 2021-03-29 LAB
LAB AP CASE REPORT: NORMAL
PATH REPORT.FINAL DX SPEC: NORMAL
PATH REPORT.GROSS SPEC: NORMAL

## 2021-04-07 ENCOUNTER — OFFICE VISIT (OUTPATIENT)
Dept: SURGERY | Facility: CLINIC | Age: 26
End: 2021-04-07

## 2021-04-07 VITALS
HEIGHT: 71 IN | BODY MASS INDEX: 28.76 KG/M2 | HEART RATE: 89 BPM | TEMPERATURE: 97.3 F | DIASTOLIC BLOOD PRESSURE: 96 MMHG | OXYGEN SATURATION: 96 % | RESPIRATION RATE: 16 BRPM | SYSTOLIC BLOOD PRESSURE: 166 MMHG | WEIGHT: 205.4 LBS

## 2021-04-07 DIAGNOSIS — D17.20 LIPOMA OF UPPER EXTREMITY, UNSPECIFIED LATERALITY: Primary | ICD-10-CM

## 2021-04-07 PROCEDURE — 99024 POSTOP FOLLOW-UP VISIT: CPT | Performed by: SURGERY

## 2021-04-07 NOTE — PROGRESS NOTES
"Post-op Note    Subjective   Salvatore Breen is a 26 y.o. male status post excision of lipomas from his bilateral upper extremities.  Not having any pain.  Wounds are healing well.  The numbness and tingling which the patient was having preoperatively down the left arm seems to have resolved.      Objective   /96 (BP Location: Right arm, Patient Position: Sitting, Cuff Size: Adult)   Pulse 89   Temp 97.3 °F (36.3 °C) (Temporal)   Resp 16   Ht 180.3 cm (71\")   Wt 93.2 kg (205 lb 6.4 oz)   SpO2 96%   BMI 28.65 kg/m²   Incisions are well-healed without any surrounding erythema, duration, or drainage to suggest infection.      Assessment/Plan   26-year-old gentleman status post excision of lipomas from his bilateral upper extremities.    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    Discussed with patient that they seemed like they were fully excised, and therefore should not return.  He is at risk of developing them in other parts of his body, and if he does, I would be happy to address those at that time.  Activity as tolerated  Follow-up with me as needed    Ramses Patterson MD  4/7/2021  14:26 EDT  "

## 2021-07-31 DIAGNOSIS — K21.9 GASTROESOPHAGEAL REFLUX DISEASE: ICD-10-CM

## 2021-08-03 RX ORDER — PANTOPRAZOLE SODIUM 40 MG/1
40 TABLET, DELAYED RELEASE ORAL DAILY
Qty: 90 TABLET | Refills: 3 | OUTPATIENT
Start: 2021-08-03

## 2022-01-28 ENCOUNTER — OFFICE VISIT (OUTPATIENT)
Dept: FAMILY MEDICINE CLINIC | Facility: CLINIC | Age: 27
End: 2022-01-28

## 2022-01-28 VITALS
BODY MASS INDEX: 26.38 KG/M2 | SYSTOLIC BLOOD PRESSURE: 110 MMHG | TEMPERATURE: 97.5 F | DIASTOLIC BLOOD PRESSURE: 70 MMHG | WEIGHT: 188.4 LBS | OXYGEN SATURATION: 98 % | RESPIRATION RATE: 16 BRPM | HEIGHT: 71 IN | HEART RATE: 81 BPM

## 2022-01-28 DIAGNOSIS — K21.9 GASTROESOPHAGEAL REFLUX DISEASE: ICD-10-CM

## 2022-01-28 DIAGNOSIS — R53.83 OTHER FATIGUE: Primary | ICD-10-CM

## 2022-01-28 PROCEDURE — 99214 OFFICE O/P EST MOD 30 MIN: CPT | Performed by: NURSE PRACTITIONER

## 2022-01-28 RX ORDER — PANTOPRAZOLE SODIUM 40 MG/1
40 TABLET, DELAYED RELEASE ORAL DAILY
Qty: 90 TABLET | Refills: 3 | Status: SHIPPED | OUTPATIENT
Start: 2022-01-28 | End: 2022-02-04

## 2022-01-28 NOTE — PROGRESS NOTES
"Subjective   Salvatore Breen is a 26 y.o. male.     History of Present Illness    Since the last visit, he has overall felt overall well.  He has GERD controlled on PPI Rx. He came off mirtazepine 4 months ago and has been able to lose weight with diet and exercise.  He may try to stop PPI for a trial to see if diet changes sufficient in controlling GERD.  He is UTD with his psychiatrist.  he has been compliant with current medications have reviewed them.  The patient denies medication side effects.  Will refill medications. /70   Pulse 81   Temp 97.5 °F (36.4 °C)   Resp 16   Ht 180.3 cm (71\")   Wt 85.5 kg (188 lb 6.4 oz)   SpO2 98%   BMI 26.28 kg/m²     Results for orders placed or performed during the hospital encounter of 03/25/21   Tissue Pathology Exam    Specimen: A: Arm, Left; Tissue    B: Arm, Right; Tissue   Result Value Ref Range    Case Report       Surgical Pathology Report                         Case: [id number]                                  Authorizing Provider:  Ramses Patterson,   Collected:           03/25/2021 01:43 PM                                 MD                                                                           Ordering Location:     [facility] MAIN  Received:            03/26/2021 08:53 AM                                 OR                                                                           Pathologist:           Handy Villalta MD                                                            Specimens:   1) - Arm, Left, left arm lipoma                                                                     2) - Arm, Right, right arm lipoma                                                          Final Diagnosis       Specimen #1 (Soft tissue, left arm, excision):    Angiolipoma    Specimen #2 (Soft tissue, right arm, excisions):    Angiolipomas    LEILA/tkd       Gross Description       Part 1: Received designated \"Left arm lipoma\" is an ovoid portion " "of yellow fatty tissue measuring 2.2 x 1.1 x 1 cm. Sectioning reveals homogenous yellow glistening cut surfaces. No hemorrhage or necrosis are  identified. Representative sections are submitted in one cassette.     Part 2: Received designated \"Right are multiple lipoma\" are two unoriented fragments of yellow fatty tissue measuring up to 2 cm in greatest dimension. Sectioning reveals homogenous yellow glistening cut surfaces. No hemorrhage or necrosis are  identified.  Representative sections are submitted in one cassette.    LEILA/tkd          Requests testosterone checked.    The following portions of the patient's history were reviewed and updated as appropriate: allergies, current medications, past family history, past medical history, past social history, past surgical history and problem list.    Review of Systems   Constitutional: Negative for fatigue.   Respiratory: Negative for cough and shortness of breath.    Cardiovascular: Negative for chest pain and palpitations.   Skin: Negative for rash.   Psychiatric/Behavioral: Negative for dysphoric mood and sleep disturbance. The patient is not nervous/anxious.        Objective   Physical Exam  Vitals and nursing note reviewed.   Constitutional:       Appearance: Normal appearance. He is well-developed.   Neck:      Vascular: No carotid bruit.   Cardiovascular:      Rate and Rhythm: Normal rate and regular rhythm.   Pulmonary:      Effort: Pulmonary effort is normal.      Breath sounds: Normal breath sounds.   Neurological:      Mental Status: He is alert and oriented to person, place, and time.   Psychiatric:         Mood and Affect: Mood normal.         Behavior: Behavior normal.         Thought Content: Thought content normal.         Judgment: Judgment normal.         Assessment/Plan   Diagnoses and all orders for this visit:    1. Other fatigue (Primary)  -     Testosterone, Free, Total    2. Gastroesophageal reflux disease  -     pantoprazole (Protonix) 40 MG " EC tablet; Take 1 tablet by mouth Daily.  Dispense: 90 tablet; Refill: 3  -     Comprehensive metabolic panel  -     Lipid panel  -     CBC and Differential  -     TSH

## 2022-02-04 DIAGNOSIS — K21.9 GASTROESOPHAGEAL REFLUX DISEASE: ICD-10-CM

## 2022-02-04 RX ORDER — PANTOPRAZOLE SODIUM 40 MG/1
40 TABLET, DELAYED RELEASE ORAL DAILY
Qty: 90 TABLET | Refills: 3 | Status: SHIPPED | OUTPATIENT
Start: 2022-02-04

## 2022-08-03 ENCOUNTER — OFFICE VISIT (OUTPATIENT)
Dept: FAMILY MEDICINE CLINIC | Facility: CLINIC | Age: 27
End: 2022-08-03

## 2022-08-03 VITALS
BODY MASS INDEX: 23.8 KG/M2 | DIASTOLIC BLOOD PRESSURE: 80 MMHG | HEART RATE: 89 BPM | TEMPERATURE: 97.9 F | HEIGHT: 71 IN | OXYGEN SATURATION: 98 % | SYSTOLIC BLOOD PRESSURE: 130 MMHG | RESPIRATION RATE: 16 BRPM | WEIGHT: 170 LBS

## 2022-08-03 DIAGNOSIS — K21.9 GASTROESOPHAGEAL REFLUX DISEASE, UNSPECIFIED WHETHER ESOPHAGITIS PRESENT: ICD-10-CM

## 2022-08-03 DIAGNOSIS — Z00.00 WELLNESS EXAMINATION: Primary | ICD-10-CM

## 2022-08-03 PROCEDURE — 99395 PREV VISIT EST AGE 18-39: CPT | Performed by: NURSE PRACTITIONER

## 2022-08-03 NOTE — PROGRESS NOTES
Subjective   Salvatore Breen is a 27 y.o. male.     History of Present Illness   Salvatore Breen 27 y.o. male who presents for an Wellness Visit.  he has a history of   Patient Active Problem List   Diagnosis   • Gastroesophageal reflux disease   • Anxiety   • Depression   .  he has been feeling well.   I  reviewed health maintenance with him as part of my preventative care plan.  Preventative counseling provided and diet/exercise and vaccinations.   UTD on dental and eye exam.   UTD on immunizations.   The following portions of the patient's history were reviewed and updated as appropriate: allergies, current medications, past family history, past medical history, past social history, past surgical history and problem list.    Review of Systems   Constitutional: Negative for fatigue.   Respiratory: Negative for cough and shortness of breath.    Cardiovascular: Negative for chest pain and palpitations.   Endocrine: Negative for cold intolerance, heat intolerance, polydipsia, polyphagia and polyuria.   Skin: Negative for rash.   Psychiatric/Behavioral: Negative for dysphoric mood and sleep disturbance. The patient is not nervous/anxious.        Objective   Physical Exam  Vitals and nursing note reviewed.   Constitutional:       Appearance: Normal appearance. He is well-developed.   Neck:      Vascular: No carotid bruit.   Cardiovascular:      Rate and Rhythm: Normal rate and regular rhythm.   Pulmonary:      Effort: Pulmonary effort is normal.      Breath sounds: Normal breath sounds.   Skin:     General: Skin is warm and dry.   Neurological:      Mental Status: He is alert and oriented to person, place, and time.   Psychiatric:         Mood and Affect: Mood normal.         Behavior: Behavior normal.         Thought Content: Thought content normal.         Judgment: Judgment normal.         Assessment & Plan   Diagnoses and all orders for this visit:    1. Wellness examination (Primary)    2. Gastroesophageal reflux  disease, unspecified whether esophagitis present               Answers for HPI/ROS submitted by the patient on 8/2/2022  What is the primary reason for your visit?: Other  Please describe your symptoms.: GERD, routine checkup  Have you had these symptoms before?: Yes  How long have you been having these symptoms?: Greater than 2 weeks  Please list any medications you are currently taking for this condition.: Pantoprazole

## 2022-08-11 LAB
ALBUMIN SERPL-MCNC: 4.9 G/DL (ref 4.1–5.2)
ALBUMIN/GLOB SERPL: 2 {RATIO} (ref 1.2–2.2)
ALP SERPL-CCNC: 71 IU/L (ref 44–121)
ALT SERPL-CCNC: 17 IU/L (ref 0–44)
AST SERPL-CCNC: 15 IU/L (ref 0–40)
BASOPHILS # BLD AUTO: 0 X10E3/UL (ref 0–0.2)
BASOPHILS NFR BLD AUTO: 1 %
BILIRUB SERPL-MCNC: 0.4 MG/DL (ref 0–1.2)
BUN SERPL-MCNC: 11 MG/DL (ref 6–20)
BUN/CREAT SERPL: 10 (ref 9–20)
CALCIUM SERPL-MCNC: 9.7 MG/DL (ref 8.7–10.2)
CHLORIDE SERPL-SCNC: 101 MMOL/L (ref 96–106)
CHOLEST SERPL-MCNC: 168 MG/DL (ref 100–199)
CO2 SERPL-SCNC: 21 MMOL/L (ref 20–29)
CREAT SERPL-MCNC: 1.13 MG/DL (ref 0.76–1.27)
EGFRCR SERPLBLD CKD-EPI 2021: 92 ML/MIN/1.73
EOSINOPHIL # BLD AUTO: 0.1 X10E3/UL (ref 0–0.4)
EOSINOPHIL NFR BLD AUTO: 1 %
ERYTHROCYTE [DISTWIDTH] IN BLOOD BY AUTOMATED COUNT: 12.4 % (ref 11.6–15.4)
GLOBULIN SER CALC-MCNC: 2.4 G/DL (ref 1.5–4.5)
GLUCOSE SERPL-MCNC: 92 MG/DL (ref 65–99)
HCT VFR BLD AUTO: 48.4 % (ref 37.5–51)
HDLC SERPL-MCNC: 52 MG/DL
HGB BLD-MCNC: 16.4 G/DL (ref 13–17.7)
IMM GRANULOCYTES # BLD AUTO: 0 X10E3/UL (ref 0–0.1)
IMM GRANULOCYTES NFR BLD AUTO: 0 %
LDLC SERPL CALC-MCNC: 95 MG/DL (ref 0–99)
LYMPHOCYTES # BLD AUTO: 2.1 X10E3/UL (ref 0.7–3.1)
LYMPHOCYTES NFR BLD AUTO: 29 %
MCH RBC QN AUTO: 30.5 PG (ref 26.6–33)
MCHC RBC AUTO-ENTMCNC: 33.9 G/DL (ref 31.5–35.7)
MCV RBC AUTO: 90 FL (ref 79–97)
MONOCYTES # BLD AUTO: 0.5 X10E3/UL (ref 0.1–0.9)
MONOCYTES NFR BLD AUTO: 8 %
NEUTROPHILS # BLD AUTO: 4.4 X10E3/UL (ref 1.4–7)
NEUTROPHILS NFR BLD AUTO: 61 %
PLATELET # BLD AUTO: 279 X10E3/UL (ref 150–450)
POTASSIUM SERPL-SCNC: 4.4 MMOL/L (ref 3.5–5.2)
PROT SERPL-MCNC: 7.3 G/DL (ref 6–8.5)
RBC # BLD AUTO: 5.37 X10E6/UL (ref 4.14–5.8)
SODIUM SERPL-SCNC: 140 MMOL/L (ref 134–144)
TRIGL SERPL-MCNC: 116 MG/DL (ref 0–149)
TSH SERPL DL<=0.005 MIU/L-ACNC: 2.36 UIU/ML (ref 0.45–4.5)
VLDLC SERPL CALC-MCNC: 21 MG/DL (ref 5–40)
WBC # BLD AUTO: 7.1 X10E3/UL (ref 3.4–10.8)

## 2022-08-14 LAB
TESTOST FREE SERPL-MCNC: 15.1 PG/ML (ref 9.3–26.5)
TESTOST SERPL-MCNC: 442 NG/DL (ref 264–916)

## 2023-02-08 NOTE — TELEPHONE ENCOUNTER
Caller: PATIENT     Best call back number: [phone]    Requested Prescriptions:   Requested Prescriptions     Pending Prescriptions Disp Refills   • lamoTRIgine (LaMICtal) 150 MG tablet       Sig: Take 0.5 tablets by mouth every night at bedtime.   • prazosin (MINIPRESS) 1 MG capsule       Sig: Take 1 capsule by mouth Every Night. 2-3 every am  2 tabs every night  For anxiety  Take DOS        [facility] where [facility] be sent[id number] - [place], [place] - [address] AT [facility] [address] [phone] [facility] [phone] FX     Additional details provided by patient: PATIENT HAS NOT SEEN DR. RAMOS AT [facility] RECENTLY, HE HAS A PENDING APPOINTMENT FOR FEBRUARY 23, 2023.      HE IS OUT OF THE  lamoTRIgine (LaMICtal) 150 MG tablet  75 mg, Every Night at Bedtime   THAT THIS PROVIDER(DR. RAMOS) HAS ORDERED FOR HIM IN THE PAST.      DR. RAMOS'S OFFICE SUGGESTED THAT PATIENT REQUEST THIS MMEDICATION BE ORDERED BY GLENDA CAMPOS. HE NEEDS ENOUGH TO GET PATIENT TO HIS UPCOMMING APPOINTMENT.      Does the patient have less than a 3 day supply:  [x] Yes  [] No    Would you like a call back once the refill request has been completed: [x] Yes [] No    If the office needs to give you a call back, can they leave a voicemail: [x] Yes [] No    Selena Patricio Rep   02/08/23 14:24 EST

## 2023-02-09 RX ORDER — PRAZOSIN HYDROCHLORIDE 1 MG/1
1 CAPSULE ORAL NIGHTLY
OUTPATIENT
Start: 2023-02-09

## 2023-02-09 RX ORDER — LAMOTRIGINE 150 MG/1
75 TABLET ORAL
OUTPATIENT
Start: 2023-02-09

## 2023-04-16 DIAGNOSIS — K21.9 GASTROESOPHAGEAL REFLUX DISEASE: ICD-10-CM

## 2023-04-18 RX ORDER — PANTOPRAZOLE SODIUM 40 MG/1
40 TABLET, DELAYED RELEASE ORAL DAILY
Qty: 30 TABLET | Refills: 0 | Status: SHIPPED | OUTPATIENT
Start: 2023-04-18

## 2023-06-15 DIAGNOSIS — K21.9 GASTROESOPHAGEAL REFLUX DISEASE: ICD-10-CM

## 2023-06-15 RX ORDER — PANTOPRAZOLE SODIUM 40 MG/1
40 TABLET, DELAYED RELEASE ORAL DAILY
Qty: 90 TABLET | Refills: 0 | Status: SHIPPED | OUTPATIENT
Start: 2023-06-15

## 2023-06-15 NOTE — TELEPHONE ENCOUNTER
Rx Refill Note  Requested Prescriptions     Pending Prescriptions Disp Refills    pantoprazole (PROTONIX) 40 MG EC tablet [Pharmacy Med Name: PANTOPRAZOLE 40MG TABLETS] 90 tablet      Sig: TAKE 1 TABLET BY MOUTH DAILY      Last office visit with prescribing clinician: 8/3/2022   Last telemedicine visit with prescribing clinician: Visit date not found   Next office visit with prescribing clinician: 8/4/2023

## 2023-08-04 ENCOUNTER — OFFICE VISIT (OUTPATIENT)
Dept: FAMILY MEDICINE CLINIC | Facility: CLINIC | Age: 28
End: 2023-08-04
Payer: COMMERCIAL

## 2023-08-04 VITALS
TEMPERATURE: 95.6 F | HEART RATE: 76 BPM | BODY MASS INDEX: 27.44 KG/M2 | RESPIRATION RATE: 16 BRPM | DIASTOLIC BLOOD PRESSURE: 90 MMHG | WEIGHT: 196 LBS | SYSTOLIC BLOOD PRESSURE: 120 MMHG | OXYGEN SATURATION: 98 % | HEIGHT: 71 IN

## 2023-08-04 DIAGNOSIS — Q99.9 HISTORY OF CHROMOSOMAL ABNORMALITY: Primary | ICD-10-CM

## 2023-08-04 DIAGNOSIS — K21.9 GASTROESOPHAGEAL REFLUX DISEASE: ICD-10-CM

## 2023-08-04 RX ORDER — CLOTRIMAZOLE 1 %
1 CREAM (GRAM) TOPICAL 2 TIMES DAILY
Qty: 28 G | Refills: 0 | Status: SHIPPED | OUTPATIENT
Start: 2023-08-04

## 2023-08-04 RX ORDER — PANTOPRAZOLE SODIUM 40 MG/1
40 TABLET, DELAYED RELEASE ORAL DAILY
Qty: 90 TABLET | Refills: 3 | Status: SHIPPED | OUTPATIENT
Start: 2023-08-04

## 2023-12-13 RX ORDER — PRAZOSIN HYDROCHLORIDE 1 MG/1
1 CAPSULE ORAL NIGHTLY
OUTPATIENT
Start: 2023-12-13

## 2024-01-23 NOTE — PROGRESS NOTES
[facility]   [address], [address]  [place] [address]  [phone]  Maryam Hernandez, MSN, APRN, PMHNP-BC    NAME: Salvatore Breen     : 1995   MRN: [id number]     Patient Care Team:  Sandra Tomas APRN ([place]) as PCP - General (Family Medicine)    DATE: 2024    -Patient was referred by: [x] SELF  [] Taoism provider  -Psychiatric history packet turned in/reviewed : [x] Yes [] No    Subjective     CHIEF COMPLAINT: depression, anxiety     HPI:  Salvatore Breen, a 28 y.o. male patient seen for the first time today for initial evaluation.    He was being seen at [facility] in [place]. His provider was retiring and he was needing someone to continue his medication. He also lives in [place] and our office is more convenient for him. He was being treated for depression and anxiety.   He moved here in 2019. He went to undergraduate at the [employer], [place].   He is a  in medical school at the [employer]. His degree will be in immunology. He's in his 5th year. 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   He got engaged recently. He is wanting to get a job in the industry and settle down.    He takes prazosin 1 capsule in the morning and 2 at night. He thinks the prazosin in the morning was for anxiety. He states anxiety is doing well.    He is also on Wellbutrin XL 300mg. He is on lamotrigine 75mg a day. He takes half a 150mg.    No past hospitalizations for depression or anxiety.   He feels like he is not enjoying things as much as he could. Still reports his mood as down/low. We discussed possibly adding another medication to his Wellbutrin, but he feels like symptoms are manageable as they are.      Mediation Trials in the Past:   Prozac: he only took it for 2-3 months.   Cymbalta: states this was fine, but his provider recommended he come off of it. Coming off it, he had brain zaps.   Adderall--didn't like how he felt  "    Hobbies: golf, sports. He is a [employer] fan and a [employer] fan. He also watches some hockey with his fiance's family.     SYMPTOMS:      MOOD: down/low.      SLEEP: sleep is good.      ENERGY: average     CONCENTRATION/FOCUS: good     APPETITE: average    PRIOR PSYCH MEDICATIONS:  Prozac  Cymbalta  Adderall     PRIOR PSYCH DX:   Depression  Anxiety     PRIOR MENTAL HEALTH PROVIDERS:  [facility]     PSYCH ADMISSIONS:   denies    SELF HARM/SUICIDALLY:   Denies     SOCIAL HISTORY:  Relationships: he is engaged.   Education: in graduate school.     SUBSTANCE USE:   Nicotine/Tobacco: formerly vaped   Alcohol: Very rarely   Illicit drugs: denies   Cannabis/Marijuana: denies    Medical History:   Lipomas   GERD   Depression   Anxiety     Family Psychiatric History:   Denies any family psychiatric history     SEIZURE HISTORY: No    Patient presents with symptoms and behaviors that are consistent with the following DSM-5 diagnoses:  Major depressive disorder  2.  Generalized anxiety disorder     Ability and capacity to respond to treatment: good  Functional status: good  Prognosis: good  Long term goals: improve depression and overall quality of life.   Short term goals:improve depression.     Objective     /86   Pulse 106   Ht 180.3 cm (71\")   Wt 88.8 kg (195 lb 12.8 oz)   SpO2 98%   BMI 27.31 kg/m²   No LMP for male patient.    Social History     Occupational History    Not on file   Tobacco Use    Smoking status: Former     Types: Electronic Cigarette    Smokeless tobacco: Never    Tobacco comments:     E-cig   Vaping Use    Vaping Use: Former    Substances: Nicotine, Flavoring    Devices: Disposable   Substance and Sexual Activity    Alcohol use: Yes     Alcohol/week: 1.0 - 2.0 standard drink of alcohol     Types: 1 - 2 Cans of beer per week     Comment: social    Drug use: Not Currently     Types: Marijuana     Comment: social     Sexual activity: Yes     Partners: Female     Birth " control/protection: I.U.D.     Family History   Problem Relation Age of Onset    Hypertension Mother     Diabetes Mother     Thyroid disease Mother     Hypertension Father     Alcohol abuse Father     Lung disease Maternal Grandfather     Alcohol abuse Paternal Grandfather     Liver disease Maternal Aunt     Liver cancer Maternal Aunt       Past Medical History:   Diagnosis Date    Acid reflux     Anxiety     Depression     GERD (gastroesophageal reflux disease)      Past Surgical History:   Procedure Laterality Date    ENDOSCOPY      MASS EXCISION Bilateral 3/25/2021    Procedure: LIPOMA EXCISION OF BILATERAL UPPER EXTREMITIES;  Surgeon: Ramses Patterson MD;  Location: [facility] MAIN OR;  Service: General;  Laterality: Bilateral;      Review of Systems     The following portions of the patient's history were reviewed and updated as appropriate: allergies, current medications, past family history, past medical history, past social history, past surgical history and problem list.      Allergy:   No Known Allergies     Discontinued Medications:  Medications Discontinued During This Encounter   Medication Reason    buPROPion XL (WELLBUTRIN XL) 150 MG 24 hr tablet *Therapy completed    lamoTRIgine (LaMICtal) 150 MG tablet Reorder    prazosin (MINIPRESS) 1 MG capsule Reorder    buPROPion XL (WELLBUTRIN XL) 300 MG 24 hr tablet Reorder       Current Medications:   Current Outpatient Medications   Medication Sig Dispense Refill    buPROPion XL (WELLBUTRIN XL) 300 MG 24 hr tablet Take 1 tablet by mouth Every Morning. 90 tablet 1    clotrimazole (LOTRIMIN) 1 % cream Apply 1 application  topically to the appropriate area as directed 2 (Two) Times a Day. 28 g 0    lamoTRIgine (LaMICtal) 150 MG tablet Take 0.5 tablets by mouth every night at bedtime. 45 tablet 1    pantoprazole (PROTONIX) 40 MG EC tablet Take 1 tablet by mouth Daily. 90 tablet 3    prazosin (MINIPRESS) 1 MG capsule Take 1 capsule by mouth Every Morning AND  2 capsules Every Night. 270 capsule 1     No current facility-administered medications for this visit.         Psychological ROS: positive for - anxiety and depression  negative for - behavioral disorder, concentration difficulties, decreased libido, disorientation, hallucinations, hostility, irritability, memory difficulties, mood swings, obsessive thoughts, physical abuse, sexual abuse, sleep disturbances, or suicidal ideation    Mental Status Exam:   Hygiene:   good  Cooperation:  Cooperative  Eye Contact:  Good  Psychomotor Behavior:  Appropriate  Affect:  Euphoric  Mood: depressed  Hopelessness: Denies  Speech:  Normal  Thought Process:  Goal directed and Linear  Thought Content:  Normal and Mood congruent  Suicidal:  None  Homicidal:  None  Hallucinations:  None  Delusion:  None  Memory:  Intact  Orientation:  Person, Place, Time, and Situation  Reliability:  good  Insight:  Good  Judgement:  Good  Impulse Control:  Good  Physical/Medical Issues:  No      PHQ-9 Depression Screening    Little interest or pleasure in doing things? 1-->several days   Feeling down, depressed, or hopeless? 1-->several days   Trouble falling or staying asleep, or sleeping too much? 1-->several days   Feeling tired or having little energy? 1-->several days   Poor appetite or overeating? 0-->not at all   Feeling bad about yourself - or that you are a failure or have let yourself or your family down? 1-->several days   Trouble concentrating on things, such as reading the newspaper or watching television? 0-->not at all   Moving or speaking so slowly that other people could have noticed? Or the opposite - being so fidgety or restless that you have been moving around a lot more than usual? 0-->not at all   Thoughts that you would be better off dead, or of hurting yourself in some way? 0-->not at all   PHQ-9 Total Score 5   If you checked off any problems, how difficult have these problems made it for you to do your work, take care of  things at home, or get along with other people? somewhat difficult        GAD7 Documentation:  Feeling nervous, anxious or on edge 2   Not being able to stop or control worrying 1   Worrying too much about different things 1   Trouble relaxing 0   Being so restless that it is hard to sit still 0   Becoming easily annoyed or irritable 0   Feeling Afraid as if something awful might happen 0   MU Total Score 4   How difficult have these problems made it for you? Somewhat difficult     Former smoker    I advised Salvatore of the risks of tobacco use.     Result Review:    Labs:  No visits with results within 3 Month(s) from this visit.   Latest known visit with results is:   Office Visit on 01/28/2022   Component Date Value Ref Range Status    Glucose 08/10/2022 92  65 - 99 mg/dL Final    BUN 08/10/2022 11  6 - 20 mg/dL Final    Creatinine 08/10/2022 1.13  0.76 - 1.27 mg/dL Final    EGFR Result 08/10/2022 92  >59 mL/min/1.73 Final    BUN/Creatinine Ratio 08/10/2022 10  9 - 20 Final    Sodium 08/10/2022 140  134 - 144 mmol/L Final    Potassium 08/10/2022 4.4  3.5 - 5.2 mmol/L Final    Chloride 08/10/2022 101  96 - 106 mmol/L Final    Total CO2 08/10/2022 21  20 - 29 mmol/L Final    Calcium 08/10/2022 9.7  8.7 - 10.2 mg/dL Final    Total Protein 08/10/2022 7.3  6.0 - 8.5 g/dL Final    Albumin 08/10/2022 4.9  4.1 - 5.2 g/dL Final    Globulin 08/10/2022 2.4  1.5 - 4.5 g/dL Final    A/G Ratio 08/10/2022 2.0  1.2 - 2.2 Final    Total Bilirubin 08/10/2022 0.4  0.0 - 1.2 mg/dL Final    Alkaline Phosphatase 08/10/2022 71  44 - 121 IU/L Final    AST (SGOT) 08/10/2022 15  0 - 40 IU/L Final    ALT (SGPT) 08/10/2022 17  0 - 44 IU/L Final    Total Cholesterol 08/10/2022 168  100 - 199 mg/dL Final    Triglycerides 08/10/2022 116  0 - 149 mg/dL Final    HDL Cholesterol 08/10/2022 52  >39 mg/dL Final    VLDL Cholesterol Dexter 08/10/2022 21  5 - 40 mg/dL Final    LDL Chol Calc ([facility]) 08/10/2022 95  0 - 99 mg/dL Final    WBC 08/10/2022 7.1  3.4  - 10.8 x10E3/uL Final    RBC 08/10/2022 5.37  4.14 - 5.80 x10E6/uL Final    Hemoglobin 08/10/2022 16.4  13.0 - 17.7 g/dL Final    Hematocrit 08/10/2022 48.4  37.5 - 51.0 % Final    MCV 08/10/2022 90  79 - 97 fL Final    MCH 08/10/2022 30.5  26.6 - 33.0 pg Final    MCHC 08/10/2022 33.9  31.5 - 35.7 g/dL Final    RDW 08/10/2022 12.4  11.6 - 15.4 % Final    Platelets 08/10/2022 279  150 - 450 x10E3/uL Final    Neutrophil Rel % 08/10/2022 61  Not Estab. % Final    Lymphocyte Rel % 08/10/2022 29  Not Estab. % Final    Monocyte Rel % 08/10/2022 8  Not Estab. % Final    Eosinophil Rel % 08/10/2022 1  Not Estab. % Final    Basophil Rel % 08/10/2022 1  Not Estab. % Final    Neutrophils Absolute 08/10/2022 4.4  1.4 - 7.0 x10E3/uL Final    Lymphocytes Absolute 08/10/2022 2.1  0.7 - 3.1 x10E3/uL Final    Monocytes Absolute 08/10/2022 0.5  0.1 - 0.9 x10E3/uL Final    Eosinophils Absolute 08/10/2022 0.1  0.0 - 0.4 x10E3/uL Final    Basophils Absolute 08/10/2022 0.0  0.0 - 0.2 x10E3/uL Final    Immature Granulocyte Rel % 08/10/2022 0  Not Estab. % Final    Immature Grans Absolute 08/10/2022 0.0  0.0 - 0.1 x10E3/uL Final    TSH 08/10/2022 2.360  0.450 - 4.500 uIU/mL Final    Testosterone, Total 08/10/2022 442  264 - 916 ng/dL Final    Comment: Adult male reference interval is based on a population of  healthy nonobese males (BMI <30) between 19 and 39 years old.  Fabian et.al. JCEM 2017,102;8636-7965. PMID: 36099585.      Testosterone, Free 08/10/2022 15.1  9.3 - 26.5 pg/mL Final       Assessment & Plan   Diagnoses and all orders for this visit:    1. Generalized anxiety disorder (Primary)  -     lamoTRIgine (LaMICtal) 150 MG tablet; Take 0.5 tablets by mouth every night at bedtime.  Dispense: 45 tablet; Refill: 1  -     prazosin (MINIPRESS) 1 MG capsule; Take 1 capsule by mouth Every Morning AND 2 capsules Every Night.  Dispense: 270 capsule; Refill: 1    2. Major depressive disorder, recurrent episode, moderate  -      buPROPion XL (WELLBUTRIN XL) 300 MG 24 hr tablet; Take 1 tablet by mouth Every Morning.  Dispense: 90 tablet; Refill: 1       Continue bupropion XL 300mg daily in the morning.   Continue lamotrigine 75mg nightly.   Continue prazosin 1mg in the morning and 2mg at night.     Visit Diagnoses:    ICD-10-CM ICD-9-CM   1. Generalized anxiety disorder  F41.1 300.02   2. Major depressive disorder, recurrent episode, moderate  F33.1 296.32       Pt history, review of systems, medications, allergies, reviewed, patient was screened today for depression/anxiety, PHQ/MU scores reviewed.  Most recent vitals/labs reviewed.  Pt was given appropriate time to ask questions and concerns were addressed. A thorough discussion was had that included review of disease process, need for continued monitoring and additional treatment options including use of pharmacological and non-pharmacological approaches to care, decisions were made and agreed upon by patient and provider.   Discussed the risks, benefits, and potential side effects of the medications; patient ackowledged and verbally consented.     TREATMENT PLAN/GOALS: Continue supportive psychotherapy efforts and medications as indicated. Treatment and medication options discussed during today's visit. Patient ackowledged and verbally consented to continue with current treatment plan and was educated on the importance of compliance with treatment and follow-up appointments.    -Short-Term Goals: Patient will be compliant with medication management and note improvement in S/S over the next 4 to 6 weeks or at next scheduled visit.  -Long-Term Goals: Patient will be compliant with the agreed treatment plan including medication regimen & F/U appt's and deny impairment in daily functioning over the next 6 months.      CRISIS RESOURCES:    In the event you have personal crisis, there are several resources to reach someone to talk with:    068 Suicide and Crisis Lifeline  Call or text 018 or  chat [employer]  [facility]'s National Helpline  9-513-084-HELP (4357)  Text your zip code to 297173 (HELP4U)  [facility] Crisis Line  Dial 988, then press 1  Text 729431    No show policy:  We understand unexpected circumstances arise; however, anytime you miss your appointment we are unable to provide you appropriate care.  In addition, each appointment missed could have been used to provide care for others.  We ask that you call at least 24 hours in advance to cancel or reschedule an appointment. We would like to take this opportunity to remind you of our policy stating patients who miss THREE appointments without cancelling or rescheduling 24 hours in advance of the appointment may be subject to cancellation of any further visits with our clinic. Please call [phone] to reschedule your appointment. If there are reasons that make it difficult for you to keep the appointments, please call and let us know how we can help. Please understand that medication prescribing will not continue without seeing your provider.        MEDICATION ISSUES:  INSPECT reviewed as expected    Discussed medication options and treatment plan of prescribed medication as well as the risks, benefits, and side effects including potential falls, possible impaired driving and metabolic adversities among others. Patient is agreeable to call the office with any worsening of symptoms or onset of side effects. Patient is agreeable to call 911 or go to the nearest ER should he/she begin having SI/HI. No medication side effects or related complaints today.     MEDS ORDERED DURING VISIT:  New Medications Ordered This Visit   Medications    buPROPion XL (WELLBUTRIN XL) 300 MG 24 hr tablet     Sig: Take 1 tablet by mouth Every Morning.     Dispense:  90 tablet     Refill:  1    lamoTRIgine (LaMICtal) 150 MG tablet     Sig: Take 0.5 tablets by mouth every night at bedtime.     Dispense:  45 tablet     Refill:  1    prazosin (MINIPRESS) 1 MG capsule      Sig: Take 1 capsule by mouth Every Morning AND 2 capsules Every Night.     Dispense:  270 capsule     Refill:  1       Return in about 6 months (around 7/25/2024).         This document has been electronically signed by CANDI Garcia  January 25, 2024 09:29 EST    Part of this note may be an electronic transcription/translation of spoken language to printed text using the Dragon Dictation System. Some of the data in this electronic note has been brought forward from a previous encounter, any necessary changes have been made, it has been reviewed by this APRN, and it is accurate.

## 2024-01-25 ENCOUNTER — PATIENT ROUNDING (BHMG ONLY) (OUTPATIENT)
Dept: PSYCHIATRY | Facility: CLINIC | Age: 29
End: 2024-01-25
Payer: COMMERCIAL

## 2024-01-25 ENCOUNTER — OFFICE VISIT (OUTPATIENT)
Dept: PSYCHIATRY | Facility: CLINIC | Age: 29
End: 2024-01-25
Payer: COMMERCIAL

## 2024-01-25 VITALS
OXYGEN SATURATION: 98 % | HEART RATE: 106 BPM | HEIGHT: 71 IN | SYSTOLIC BLOOD PRESSURE: 140 MMHG | BODY MASS INDEX: 27.41 KG/M2 | DIASTOLIC BLOOD PRESSURE: 86 MMHG | WEIGHT: 195.8 LBS

## 2024-01-25 DIAGNOSIS — F41.1 GENERALIZED ANXIETY DISORDER: Primary | Chronic | ICD-10-CM

## 2024-01-25 DIAGNOSIS — F33.1 MAJOR DEPRESSIVE DISORDER, RECURRENT EPISODE, MODERATE: Chronic | ICD-10-CM

## 2024-01-25 RX ORDER — BUPROPION HYDROCHLORIDE 300 MG/1
300 TABLET ORAL EVERY MORNING
Qty: 90 TABLET | Refills: 1 | Status: SHIPPED | OUTPATIENT
Start: 2024-01-25

## 2024-01-25 RX ORDER — LAMOTRIGINE 150 MG/1
75 TABLET ORAL
Qty: 45 TABLET | Refills: 1 | Status: SHIPPED | OUTPATIENT
Start: 2024-01-25

## 2024-01-25 RX ORDER — BUPROPION HYDROCHLORIDE 300 MG/1
300 TABLET ORAL EVERY MORNING
COMMUNITY
End: 2024-01-25 | Stop reason: SDUPTHER

## 2024-01-25 RX ORDER — PRAZOSIN HYDROCHLORIDE 1 MG/1
CAPSULE ORAL
Qty: 270 CAPSULE | Refills: 1 | Status: SHIPPED | OUTPATIENT
Start: 2024-01-25

## 2024-01-25 NOTE — PROGRESS NOTES
A My-chart message has been sent to the patient for PATIENT ROUNDING with [facility].

## 2024-07-21 DIAGNOSIS — F33.1 MAJOR DEPRESSIVE DISORDER, RECURRENT EPISODE, MODERATE: Chronic | ICD-10-CM

## 2024-07-22 RX ORDER — BUPROPION HYDROCHLORIDE 300 MG/1
300 TABLET ORAL EVERY MORNING
Qty: 90 TABLET | Refills: 1 | Status: SHIPPED | OUTPATIENT
Start: 2024-07-22

## 2024-07-22 NOTE — TELEPHONE ENCOUNTER
Rx Refill Note  Requested Prescriptions     Pending Prescriptions Disp Refills    buPROPion XL (WELLBUTRIN XL) 300 MG 24 hr tablet [Pharmacy Med Name: BUPROPION XL 300MG TABLETS] 90 tablet 1     Sig: TAKE 1 TABLET BY MOUTH EVERY MORNING      Last office visit with prescribing clinician: 1/25/2024   Last telemedicine visit with prescribing clinician: Visit date not found   Next office visit with prescribing clinician: 7/30/2024   Office Visit with Maryam Hernandez APRN (01/25/2024)                       Would you like a call back once the refill request has been completed: [] Yes [] No    If the office needs to give you a call back, can they leave a voicemail: [] Yes [] No    Jenna Feliciano  07/22/24, 10:56 EDT

## 2024-07-29 NOTE — PROGRESS NOTES
[facility]   [address], [address]  [place] [address]  [phone]  Maryam Hernandez, MSN, APRN, PMP-BC    NAME: Salvatore Breen     : 1995   MRN: [id number]     Patient Care Team:  Wendy Goodman APRN as PCP - General (Nurse Practitioner)    DATE: 2024    Subjective     CHIEF COMPLAINT: depression, anxiety     HPI:  Salvatore Breen, a 29 y.o. male patient seen for follow up for psychiatric medication management.     Medication adjustments last visit:   Continue bupropion XL 300mg daily in the morning.   Continue lamotrigine 75mg nightly.   Continue prazosin 1mg in the morning and 2mg at night.     Patient here for follow up. States he is doing ok. School for him is more stressful in the summer. 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 He has some work related stress.   He is getting  in November at [employer].   He endorses some depression still, but states it is manageable, and he feels like he romeo ok.   Denies any active suicidal thoughts with any plan or intent.   Will plan for a 6 month follow up. We discussed possibly a year, but patient may be moving in that time frame.       24  He was being seen at [facility] in [place]. His provider was retiring and he was needing someone to continue his medication. He also lives in [place] and our office is more convenient for him. He was being treated for depression and anxiety.   He moved here in 2019. He went to undergraduate at the [employer].   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 His degree will be in immunology. He's in his 5th year. 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   He got engaged recently. He is wanting to get a job in the industry and settle down.    He takes prazosin 1 capsule in the morning and 2 at night. He thinks the prazosin in the morning was for anxiety. He states anxiety is doing well.    He is also on Wellbutrin  XL 300mg. He is on lamotrigine 75mg a day. He takes half a 150mg.    No past hospitalizations for depression or anxiety.   He feels like he is not enjoying things as much as he could. Still reports his mood as down/low. We discussed possibly adding another medication to his Wellbutrin, but he feels like symptoms are manageable as they are.      Mediation Trials in the Past:   Prozac: he only took it for 2-3 months.   Cymbalta: states this was fine, but his provider recommended he come off of it. Coming off it, he had brain zaps.   Adderall--didn't like how he felt     Hobbies: golf, sports. He is a [employer] fan and a [employer] fan. He also watches some hockey with his fiance's family.     PRIOR PSYCH MEDICATIONS:  Prozac  Cymbalta  Adderall     PRIOR PSYCH DX:   Depression  Anxiety     PRIOR MENTAL HEALTH PROVIDERS:  [facility]     PSYCH ADMISSIONS:   denies    SELF HARM/SUICIDALLY:   Denies     SOCIAL HISTORY:  Relationships: he is engaged.   Education: in graduate school.     SUBSTANCE USE:   Nicotine/Tobacco: formerly vaped   Alcohol: Very rarely   Illicit drugs: denies   Cannabis/Marijuana: denies    Medical History:   Lipomas   GERD   Depression   Anxiety     Family Psychiatric History:   Denies any family psychiatric history     SEIZURE HISTORY: No    Patient presents with symptoms and behaviors that are consistent with the following DSM-5 diagnoses:  Major depressive disorder  2.  Generalized anxiety disorder   Objective     There were no vitals taken for this visit.  No LMP for male patient.    Social History     Occupational History    Not on file   Tobacco Use    Smoking status: Former     Types: Electronic Cigarette    Smokeless tobacco: Never    Tobacco comments:     E-cig   Vaping Use    Vaping status: Former    Substances: Nicotine, Flavoring    Devices: Disposable   Substance and Sexual Activity    Alcohol use: Yes     Alcohol/week: 1.0 - 2.0 standard drink of alcohol     Types: 1 - 2 Cans of  beer per week     Comment: social    Drug use: Not Currently     Types: Marijuana     Comment: social     Sexual activity: Yes     Partners: Female     Birth control/protection: I.U.D.     Family History   Problem Relation Age of Onset    Hypertension Mother     Diabetes Mother     Thyroid disease Mother     Hypertension Father     Alcohol abuse Father     Lung disease Maternal Grandfather     Alcohol abuse Paternal Grandfather     Liver disease Maternal Aunt     Liver cancer Maternal Aunt       Past Medical History:   Diagnosis Date    Acid reflux     Anxiety     Depression     GERD (gastroesophageal reflux disease)      Past Surgical History:   Procedure Laterality Date    ENDOSCOPY      MASS EXCISION Bilateral 3/25/2021    Procedure: LIPOMA EXCISION OF BILATERAL UPPER EXTREMITIES;  Surgeon: Ramses Patterson MD;  Location: [facility] MAIN OR;  Service: General;  Laterality: Bilateral;      Review of Systems     The following portions of the patient's history were reviewed and updated as appropriate: allergies, current medications, past family history, past medical history, past social history, past surgical history and problem list.      Allergy:   No Known Allergies     Discontinued Medications:  Medications Discontinued During This Encounter   Medication Reason    lamoTRIgine (LaMICtal) 150 MG tablet Reorder    prazosin (MINIPRESS) 1 MG capsule Reorder         Current Medications:   Current Outpatient Medications   Medication Sig Dispense Refill    lamoTRIgine (LaMICtal) 150 MG tablet Take 0.5 tablets by mouth every night at bedtime. 45 tablet 3    prazosin (MINIPRESS) 1 MG capsule Take 1 capsule by mouth Every Morning AND 2 capsules Every Night. 270 capsule 3    buPROPion XL (WELLBUTRIN XL) 300 MG 24 hr tablet TAKE 1 TABLET BY MOUTH EVERY MORNING 90 tablet 1    clotrimazole (LOTRIMIN) 1 % cream Apply 1 application  topically to the appropriate area as directed 2 (Two) Times a Day. 28 g 0    pantoprazole  (PROTONIX) 40 MG EC tablet Take 1 tablet by mouth Daily. 90 tablet 3     No current facility-administered medications for this visit.     MENTAL STATUS EXAM   General Appearance:  Cleanly groomed and dressed  Eye Contact:  Good eye contact  Attitude:  Cooperative  Motor Activity:  Normal gait, posture  Muscle Strength:  Normal  Speech:  Normal rate, tone, volume  Language:  Spontaneous  Mood and affect:  Depressed  Hopelessness:  Denies  Loneliness: Denies  Thought Process:  Logical and goal-directed  Associations/ Thought Content:  No delusions  Hallucinations:  None  Suicidal Ideations:  Not present  Homicidal Ideation:  Not present  Sensorium:  Alert  Orientation:  Person, place, time and situation  Immediate Recall, Recent, and Remote Memory:  Intact  Attention Span/ Concentration:  Good  Fund of Knowledge:  Appropriate for age and educational level  Intellectual Functioning:  Average range  Insight:  Good  Judgement:  Good  Reliability:  Good  Impulse Control:  Good       PHQ-9 Depression Screening    Little interest or pleasure in doing things? 1-->several days   Feeling down, depressed, or hopeless? 1-->several days   Trouble falling or staying asleep, or sleeping too much? 0-->not at all   Feeling tired or having little energy? 1-->several days   Poor appetite or overeating? 0-->not at all   Feeling bad about yourself - or that you are a failure or have let yourself or your family down? 1-->several days   Trouble concentrating on things, such as reading the newspaper or watching television? 0-->not at all   Moving or speaking so slowly that other people could have noticed? Or the opposite - being so fidgety or restless that you have been moving around a lot more than usual? 0-->not at all   Thoughts that you would be better off dead, or of hurting yourself in some way? 1-->several days   PHQ-9 Total Score 5   If you checked off any problems, how difficult have these problems made it for you to do your work,  take care of things at home, or get along with other people? somewhat difficult        GAD7 Documentation:  Feeling nervous, anxious or on edge 2   Not being able to stop or control worrying 1   Worrying too much about different things 1   Trouble relaxing 0   Being so restless that it is hard to sit still 0   Becoming easily annoyed or irritable 0   Feeling Afraid as if something awful might happen 1   MU Total Score 5   How difficult have these problems made it for you? Somewhat difficult     Former smoker    I advised Salvatore of the risks of tobacco use.     Result Review:    Labs:  No visits with results within 3 Month(s) from this visit.   Latest known visit with results is:   Office Visit on 01/28/2022   Component Date Value Ref Range Status    Glucose 08/10/2022 92  65 - 99 mg/dL Final    BUN 08/10/2022 11  6 - 20 mg/dL Final    Creatinine 08/10/2022 1.13  0.76 - 1.27 mg/dL Final    EGFR Result 08/10/2022 92  >59 mL/min/1.73 Final    BUN/Creatinine Ratio 08/10/2022 10  9 - 20 Final    Sodium 08/10/2022 140  134 - 144 mmol/L Final    Potassium 08/10/2022 4.4  3.5 - 5.2 mmol/L Final    Chloride 08/10/2022 101  96 - 106 mmol/L Final    Total CO2 08/10/2022 21  20 - 29 mmol/L Final    Calcium 08/10/2022 9.7  8.7 - 10.2 mg/dL Final    Total Protein 08/10/2022 7.3  6.0 - 8.5 g/dL Final    Albumin 08/10/2022 4.9  4.1 - 5.2 g/dL Final    Globulin 08/10/2022 2.4  1.5 - 4.5 g/dL Final    A/G Ratio 08/10/2022 2.0  1.2 - 2.2 Final    Total Bilirubin 08/10/2022 0.4  0.0 - 1.2 mg/dL Final    Alkaline Phosphatase 08/10/2022 71  44 - 121 IU/L Final    AST (SGOT) 08/10/2022 15  0 - 40 IU/L Final    ALT (SGPT) 08/10/2022 17  0 - 44 IU/L Final    Total Cholesterol 08/10/2022 168  100 - 199 mg/dL Final    Triglycerides 08/10/2022 116  0 - 149 mg/dL Final    HDL Cholesterol 08/10/2022 52  >39 mg/dL Final    VLDL Cholesterol Dexter 08/10/2022 21  5 - 40 mg/dL Final    LDL Chol Calc ([facility]) 08/10/2022 95  0 - 99 mg/dL Final    WBC  08/10/2022 7.1  3.4 - 10.8 x10E3/uL Final    RBC 08/10/2022 5.37  4.14 - 5.80 x10E6/uL Final    Hemoglobin 08/10/2022 16.4  13.0 - 17.7 g/dL Final    Hematocrit 08/10/2022 48.4  37.5 - 51.0 % Final    MCV 08/10/2022 90  79 - 97 fL Final    MCH 08/10/2022 30.5  26.6 - 33.0 pg Final    MCHC 08/10/2022 33.9  31.5 - 35.7 g/dL Final    RDW 08/10/2022 12.4  11.6 - 15.4 % Final    Platelets 08/10/2022 279  150 - 450 x10E3/uL Final    Neutrophil Rel % 08/10/2022 61  Not Estab. % Final    Lymphocyte Rel % 08/10/2022 29  Not Estab. % Final    Monocyte Rel % 08/10/2022 8  Not Estab. % Final    Eosinophil Rel % 08/10/2022 1  Not Estab. % Final    Basophil Rel % 08/10/2022 1  Not Estab. % Final    Neutrophils Absolute 08/10/2022 4.4  1.4 - 7.0 x10E3/uL Final    Lymphocytes Absolute 08/10/2022 2.1  0.7 - 3.1 x10E3/uL Final    Monocytes Absolute 08/10/2022 0.5  0.1 - 0.9 x10E3/uL Final    Eosinophils Absolute 08/10/2022 0.1  0.0 - 0.4 x10E3/uL Final    Basophils Absolute 08/10/2022 0.0  0.0 - 0.2 x10E3/uL Final    Immature Granulocyte Rel % 08/10/2022 0  Not Estab. % Final    Immature Grans Absolute 08/10/2022 0.0  0.0 - 0.1 x10E3/uL Final    TSH 08/10/2022 2.360  0.450 - 4.500 uIU/mL Final    Testosterone, Total 08/10/2022 442  264 - 916 ng/dL Final    Comment: Adult male reference interval is based on a population of  healthy nonobese males (BMI <30) between 19 and 39 years old.  Fabian et.al. JCEM 2017,102;9002-1118. PMID: 66443735.      Testosterone, Free 08/10/2022 15.1  9.3 - 26.5 pg/mL Final       Assessment & Plan   Diagnoses and all orders for this visit:    1. Major depressive disorder, recurrent episode, moderate (Primary)    2. Generalized anxiety disorder  -     lamoTRIgine (LaMICtal) 150 MG tablet; Take 0.5 tablets by mouth every night at bedtime.  Dispense: 45 tablet; Refill: 3  -     prazosin (MINIPRESS) 1 MG capsule; Take 1 capsule by mouth Every Morning AND 2 capsules Every Night.  Dispense: 270 capsule;  Refill: 3         Continue bupropion XL 300mg daily in the morning.   Continue lamotrigine 75mg nightly.   Continue prazosin 1mg in the morning and 2mg at night.     Visit Diagnoses:    ICD-10-CM ICD-9-CM   1. Major depressive disorder, recurrent episode, moderate  F33.1 296.32   2. Generalized anxiety disorder  F41.1 300.02         Pt history, review of systems, medications, allergies, reviewed, patient was screened today for depression/anxiety, PHQ/MU scores reviewed.  Most recent vitals/labs reviewed.  Pt was given appropriate time to ask questions and concerns were addressed. A thorough discussion was had that included review of disease process, need for continued monitoring and additional treatment options including use of pharmacological and non-pharmacological approaches to care, decisions were made and agreed upon by patient and provider.   Discussed the risks, benefits, and potential side effects of the medications; patient ackowledged and verbally consented.     TREATMENT PLAN/GOALS: Continue supportive psychotherapy efforts and medications as indicated. Treatment and medication options discussed during today's visit. Patient ackowledged and verbally consented to continue with current treatment plan and was educated on the importance of compliance with treatment and follow-up appointments.    -Short-Term Goals: Patient will be compliant with medication management and note improvement in S/S over the next 4 to 6 weeks or at next scheduled visit.  -Long-Term Goals: Patient will be compliant with the agreed treatment plan including medication regimen & F/U appt's and deny impairment in daily functioning over the next 6 months.      CRISIS RESOURCES:    In the event you have personal crisis, there are several resources to reach someone to talk with:    988 Suicide and Crisis Lifeline  Call or text 988 or chat 988lifeline.org  [facility]'s National Helpline  1-757-783-HELP (0138)  Text your zip code to 346398  (HELP4U)  [place]'s Crisis Line  Dial 988, then press 1  Text 773806    No show policy:  We understand unexpected circumstances arise; however, anytime you miss your appointment we are unable to provide you appropriate care.  In addition, each appointment missed could have been used to provide care for others.  We ask that you call at least 24 hours in advance to cancel or reschedule an appointment. We would like to take this opportunity to remind you of our policy stating patients who miss THREE appointments without cancelling or rescheduling 24 hours in advance of the appointment may be subject to cancellation of any further visits with our clinic. Please call [phone] to reschedule your appointment. If there are reasons that make it difficult for you to keep the appointments, please call and let us know how we can help. Please understand that medication prescribing will not continue without seeing your provider.        MEDICATION ISSUES:  INSPECT reviewed as expected    Discussed medication options and treatment plan of prescribed medication as well as the risks, benefits, and side effects including potential falls, possible impaired driving and metabolic adversities among others. Patient is agreeable to call the office with any worsening of symptoms or onset of side effects. Patient is agreeable to call 911 or go to the nearest ER should he/she begin having SI/HI. No medication side effects or related complaints today.     MEDS ORDERED DURING VISIT:  New Medications Ordered This Visit   Medications    lamoTRIgine (LaMICtal) 150 MG tablet     Sig: Take 0.5 tablets by mouth every night at bedtime.     Dispense:  45 tablet     Refill:  3    prazosin (MINIPRESS) 1 MG capsule     Sig: Take 1 capsule by mouth Every Morning AND 2 capsules Every Night.     Dispense:  270 capsule     Refill:  3       Return in about 6 months (around 1/30/2025).         This document has been electronically signed by Maryam JAIMES  CANDI Hernandez  July 30, 2024 09:10 EDT    Part of this note may be an electronic transcription/translation of spoken language to printed text using the Dragon Dictation System. Some of the data in this electronic note has been brought forward from a previous encounter, any necessary changes have been made, it has been reviewed by this APRN, and it is accurate.

## 2024-07-30 ENCOUNTER — OFFICE VISIT (OUTPATIENT)
Dept: PSYCHIATRY | Facility: CLINIC | Age: 29
End: 2024-07-30
Payer: COMMERCIAL

## 2024-07-30 DIAGNOSIS — F33.1 MAJOR DEPRESSIVE DISORDER, RECURRENT EPISODE, MODERATE: Primary | Chronic | ICD-10-CM

## 2024-07-30 DIAGNOSIS — F41.1 GENERALIZED ANXIETY DISORDER: Chronic | ICD-10-CM

## 2024-07-30 PROCEDURE — 99214 OFFICE O/P EST MOD 30 MIN: CPT

## 2024-07-30 RX ORDER — LAMOTRIGINE 150 MG/1
75 TABLET ORAL
Qty: 45 TABLET | Refills: 3 | Status: SHIPPED | OUTPATIENT
Start: 2024-07-30

## 2024-07-30 RX ORDER — PRAZOSIN HYDROCHLORIDE 1 MG/1
CAPSULE ORAL
Qty: 270 CAPSULE | Refills: 3 | Status: SHIPPED | OUTPATIENT
Start: 2024-07-30

## 2024-08-15 DIAGNOSIS — K21.9 GASTROESOPHAGEAL REFLUX DISEASE: ICD-10-CM

## 2024-08-15 RX ORDER — PANTOPRAZOLE SODIUM 40 MG/1
40 TABLET, DELAYED RELEASE ORAL DAILY
Qty: 90 TABLET | Refills: 3 | Status: SHIPPED | OUTPATIENT
Start: 2024-08-15

## 2024-08-15 NOTE — TELEPHONE ENCOUNTER
Rx Refill Note  Requested Prescriptions     Pending Prescriptions Disp Refills    pantoprazole (PROTONIX) 40 MG EC tablet [Pharmacy Med Name: PANTOPRAZOLE 40MG TABLETS] 90 tablet 3     Sig: TAKE 1 TABLET BY MOUTH DAILY      Last office visit with prescribing clinician: 8/4/2023   Last telemedicine visit with prescribing clinician: Visit date not found   Next office visit with prescribing clinician: Visit date not found                         Would you like a call back once the refill request has been completed: [] Yes [] No    If the office needs to give you a call back, can they leave a voicemail: [] Yes [] No    Josephine Moran MA  08/15/24, 11:04 EDT

## 2025-01-30 ENCOUNTER — OFFICE VISIT (OUTPATIENT)
Dept: PSYCHIATRY | Facility: CLINIC | Age: 30
End: 2025-01-30
Payer: COMMERCIAL

## 2025-01-30 DIAGNOSIS — F33.1 MAJOR DEPRESSIVE DISORDER, RECURRENT EPISODE, MODERATE: Chronic | ICD-10-CM

## 2025-01-30 DIAGNOSIS — F41.1 GENERALIZED ANXIETY DISORDER: ICD-10-CM

## 2025-01-30 DIAGNOSIS — F41.1 GENERALIZED ANXIETY DISORDER: Chronic | ICD-10-CM

## 2025-01-30 DIAGNOSIS — F33.1 MAJOR DEPRESSIVE DISORDER, RECURRENT EPISODE, MODERATE: Primary | Chronic | ICD-10-CM

## 2025-01-30 RX ORDER — PRAZOSIN HYDROCHLORIDE 2 MG/1
2 CAPSULE ORAL 2 TIMES DAILY
Qty: 60 CAPSULE | Refills: 2 | Status: SHIPPED | OUTPATIENT
Start: 2025-01-30

## 2025-01-30 RX ORDER — PRAZOSIN HYDROCHLORIDE 2 MG/1
2 CAPSULE ORAL 2 TIMES DAILY
Qty: 180 CAPSULE | OUTPATIENT
Start: 2025-01-30

## 2025-01-30 RX ORDER — BUPROPION HYDROCHLORIDE 300 MG/1
300 TABLET ORAL EVERY MORNING
Qty: 90 TABLET | Refills: 3 | Status: SHIPPED | OUTPATIENT
Start: 2025-01-30

## 2025-01-30 NOTE — PROGRESS NOTES
[facility]   [facility], [address]  [place] [address]  [phone]  Maryam Hernandez, MSN, APRN, PMHNP-BC    NAME: Salvatore Breen     : 1995   MRN: [id number]     Patient Care Team:  Sandra Tomas)CANDI as PCP - General (Family Medicine)    DATE: 2025    Subjective     CHIEF COMPLAINT: depression, anxiety     HPI:  Salvatore Breen, a 29 y.o. male patient seen for follow up for psychiatric medication management.     Medication adjustments last visit:   Continue bupropion XL 300mg daily in the morning.   Continue lamotrigine 75mg nightly.   Continue prazosin 1mg in the morning and 2mg at night.     Patient or patient representative verbalized consent for the use of Ambient Listening during the visit with  CANDI Garcia for chart documentation. 2025  10:29 EST  History of Present Illness  The patient presents for follow up for depression and anxiety.     He reports a generally positive mood with no current symptoms of depression or anxiety. However, he has been experiencing an increase in intrusive thoughts over the past few months, which he attributes to stress. These thoughts are predominantly anxiety-related, focusing on concerns about future employment and past events. He does not endorse any suicidal ideation.     He recalls a period of relief from these intrusive thoughts following the initiation of Lamictal, but the reason for their recent resurgence is unclear. He speculates that the return of these thoughts may be linked to increased stress levels. 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 He is currently on a regimen of lamotrigine 75 mg, which he believes has been beneficial. He previously tried a higher dose of 150 mg but found it too sedating. He has not yet tried a 100 mg dose. He also takes prazosin, 1 mg in the  morning and 2 mg at night, but is uncertain about its effectiveness in managing his anxiety. He was initially prescribed prazosin for nightmares following the death of his father, but he no longer experiences these.  He is also on Wellbutrin.    We discussed possibly increasing prazosin to 2mg in the morning and 2mg at night. If patient does not find relief from anxiety and intrusive thoughts with this change, will considering tapering this medication off to see if there is any change, and increasing lamotrigine to 100mg. Patient advised to call if he would like lamotrigine 100mg dose sent to pharmacy.     Denies any SI/HI/AVH.     SOCIAL HISTORY  He got  in November. He is planning to graduate in May. Will start 2 year post-doctorate position at that time. Patient would like to eventually work in the industry for company like [employer] or [employer]. 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     MEDICATIONS  Current: lamotrigine, prazosin, Wellbutrin        7/30/24: Patient here for follow up. States he is doing ok. School for him is more stressful in the summer. 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 He has some work related stress.   He is getting  in November at [employer].   He endorses some depression still, but states it is manageable, and he feels like he romeo ok.   Denies any active suicidal thoughts with any plan or intent.   Will plan for a 6 month follow up. We discussed possibly a year, but patient may be moving in that time frame.       1/25/24  He was being seen at [facility] in [place]. His provider was retiring and he was needing someone to continue his medication. He also lives in [place] and our office is more convenient for him. He was being treated for depression and anxiety.   He moved here in 2019. He went to undergraduate at the [employer], [place], operated by [employer].   He is a  in medical school at the [employer]. His degree will be in immunology. He's in  his 5th year. 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   He got engaged recently. He is wanting to get a job in the industry and settle down.    He takes prazosin 1 capsule in the morning and 2 at night. He thinks the prazosin in the morning was for anxiety. He states anxiety is doing well.    He is also on Wellbutrin XL 300mg. He is on lamotrigine 75mg a day. He takes half a 150mg.    No past hospitalizations for depression or anxiety.   He feels like he is not enjoying things as much as he could. Still reports his mood as down/low. We discussed possibly adding another medication to his Wellbutrin, but he feels like symptoms are manageable as they are.      Mediation Trials in the Past:   Prozac: he only took it for 2-3 months.   Cymbalta: states this was fine, but his provider recommended he come off of it. Coming off it, he had brain zaps.   Adderall--didn't like how he felt     Hobbies: golf, sports. He is a [employer] fan and a [employer] fan. He also watches some hockey with his fiance's family.     PRIOR PSYCH MEDICATIONS:  Prozac  Cymbalta  Adderall     PRIOR PSYCH DX:   Depression  Anxiety     PRIOR MENTAL HEALTH PROVIDERS:  [facility]     PSYCH ADMISSIONS:   denies    SELF HARM/SUICIDALLY:   Denies     SOCIAL HISTORY:  Relationships: he is engaged.   Education: in graduate school.     SUBSTANCE USE:   Nicotine/Tobacco: formerly vaped   Alcohol: Very rarely   Illicit drugs: denies   Cannabis/Marijuana: denies    Medical History:   Lipomas   GERD   Depression   Anxiety     Family Psychiatric History:   Denies any family psychiatric history     SEIZURE HISTORY: No    Patient presents with symptoms and behaviors that are consistent with the following DSM-5 diagnoses:  Major depressive disorder  2.  Generalized anxiety disorder   Objective     There were no vitals taken for this visit.  No LMP for male patient.    Social History     Occupational History    Not on file   Tobacco Use    Smoking status: Former     Types:  Electronic Cigarette    Smokeless tobacco: Never    Tobacco comments:     E-cig   Vaping Use    Vaping status: Former    Substances: Nicotine, Flavoring    Devices: Disposable   Substance and Sexual Activity    Alcohol use: Yes     Alcohol/week: 1.0 - 2.0 standard drink of alcohol     Types: 1 - 2 Cans of beer per week     Comment: social    Drug use: Not Currently     Types: Marijuana     Comment: social     Sexual activity: Yes     Partners: Female     Birth control/protection: I.U.D.     Family History   Problem Relation Age of Onset    Hypertension Mother     Diabetes Mother     Thyroid disease Mother     Hypertension Father     Alcohol abuse Father     Lung disease Maternal Grandfather     Alcohol abuse Paternal Grandfather     Liver disease Maternal Aunt     Liver cancer Maternal Aunt       Past Medical History:   Diagnosis Date    Acid reflux     Anxiety     Depression     GERD (gastroesophageal reflux disease)      Past Surgical History:   Procedure Laterality Date    ENDOSCOPY      MASS EXCISION Bilateral 3/25/2021    Procedure: LIPOMA EXCISION OF BILATERAL UPPER EXTREMITIES;  Surgeon: Ramses Patterson MD;  Location: [facility] OR;  Service: General;  Laterality: Bilateral;      Review of Systems     The following portions of the patient's history were reviewed and updated as appropriate: allergies, current medications, past family history, past medical history, past social history, past surgical history and problem list.      Allergy:   No Known Allergies     Discontinued Medications:  Medications Discontinued During This Encounter   Medication Reason    clotrimazole (LOTRIMIN) 1 % cream     buPROPion XL (WELLBUTRIN XL) 300 MG 24 hr tablet Reorder           Current Medications:   Current Outpatient Medications   Medication Sig Dispense Refill    buPROPion XL (WELLBUTRIN XL) 300 MG 24 hr tablet Take 1 tablet by mouth Every Morning. 90 tablet 3    lamoTRIgine (LaMICtal) 150 MG tablet Take 0.5  tablets by mouth every night at bedtime. 45 tablet 3    pantoprazole (PROTONIX) 40 MG EC tablet TAKE 1 TABLET BY MOUTH DAILY 90 tablet 3    prazosin (MINIPRESS) 1 MG capsule Take 1 capsule by mouth Every Morning AND 2 capsules Every Night. 270 capsule 3    prazosin (MINIPRESS) 2 MG capsule Take 1 capsule by mouth 2 (Two) Times a Day. 60 capsule 2     No current facility-administered medications for this visit.     MENTAL STATUS EXAM   General Appearance:  Cleanly groomed and dressed  Eye Contact:  Good eye contact  Attitude:  Cooperative  Motor Activity:  Normal gait, posture  Muscle Strength:  Normal  Speech:  Normal rate, tone, volume  Language:  Spontaneous  Mood and affect:  Depressed  Hopelessness:  Denies  Loneliness: Denies  Thought Process:  Logical and goal-directed  Associations/ Thought Content:  No delusions  Hallucinations:  None  Suicidal Ideations:  Not present  Homicidal Ideation:  Not present  Sensorium:  Alert  Orientation:  Person, place, time and situation  Immediate Recall, Recent, and Remote Memory:  Intact  Attention Span/ Concentration:  Good  Fund of Knowledge:  Appropriate for age and educational level  Intellectual Functioning:  Average range  Insight:  Good  Judgement:  Good  Reliability:  Good  Impulse Control:  Good     PHQ-9 Depression Screening  Little interest or pleasure in doing things? Several days   Feeling down, depressed, or hopeless? Several days   PHQ-2 Total Score 2   Trouble falling or staying asleep, or sleeping too much? Not at all   Feeling tired or having little energy? Several days   Poor appetite or overeating? Several days   Feeling bad about yourself - or that you are a failure or have let yourself or your family down? Several days   Trouble concentrating on things, such as reading the newspaper or watching television? Not at all   Moving or speaking so slowly that other people could have noticed? Or the opposite - being so fidgety or restless that you have been  moving around a lot more than usual? Not at all   Thoughts that you would be better off dead, or of hurting yourself in some way? Several days   PHQ-9 Total Score 6   If you checked off any problems, how difficult have these problems made it for you to do your work, take care of things at home, or get along with other people? Somewhat difficult           GAD7 Documentation:  Feeling nervous, anxious or on edge 1   Not being able to stop or control worrying 0   Worrying too much about different things 1   Trouble relaxing 1   Being so restless that it is hard to sit still 0   Becoming easily annoyed or irritable 1   Feeling Afraid as if something awful might happen 1   MU Total Score 5   How difficult have these problems made it for you? Somewhat difficult     Former smoker    I advised Salvatore of the risks of tobacco use.     Result Review:    Labs:  No visits with results within 3 Month(s) from this visit.   Latest known visit with results is:   Office Visit on 01/28/2022   Component Date Value Ref Range Status    Glucose 08/10/2022 92  65 - 99 mg/dL Final    BUN 08/10/2022 11  6 - 20 mg/dL Final    Creatinine 08/10/2022 1.13  0.76 - 1.27 mg/dL Final    EGFR Result 08/10/2022 92  >59 mL/min/1.73 Final    BUN/Creatinine Ratio 08/10/2022 10  9 - 20 Final    Sodium 08/10/2022 140  134 - 144 mmol/L Final    Potassium 08/10/2022 4.4  3.5 - 5.2 mmol/L Final    Chloride 08/10/2022 101  96 - 106 mmol/L Final    Total CO2 08/10/2022 21  20 - 29 mmol/L Final    Calcium 08/10/2022 9.7  8.7 - 10.2 mg/dL Final    Total Protein 08/10/2022 7.3  6.0 - 8.5 g/dL Final    Albumin 08/10/2022 4.9  4.1 - 5.2 g/dL Final    Globulin 08/10/2022 2.4  1.5 - 4.5 g/dL Final    A/G Ratio 08/10/2022 2.0  1.2 - 2.2 Final    Total Bilirubin 08/10/2022 0.4  0.0 - 1.2 mg/dL Final    Alkaline Phosphatase 08/10/2022 71  44 - 121 IU/L Final    AST (SGOT) 08/10/2022 15  0 - 40 IU/L Final    ALT (SGPT) 08/10/2022 17  0 - 44 IU/L Final    Total Cholesterol  08/10/2022 168  100 - 199 mg/dL Final    Triglycerides 08/10/2022 116  0 - 149 mg/dL Final    HDL Cholesterol 08/10/2022 52  >39 mg/dL Final    VLDL Cholesterol Dexter 08/10/2022 21  5 - 40 mg/dL Final    LDL Chol Calc ([facility]) 08/10/2022 95  0 - 99 mg/dL Final    WBC 08/10/2022 7.1  3.4 - 10.8 x10E3/uL Final    RBC 08/10/2022 5.37  4.14 - 5.80 x10E6/uL Final    Hemoglobin 08/10/2022 16.4  13.0 - 17.7 g/dL Final    Hematocrit 08/10/2022 48.4  37.5 - 51.0 % Final    MCV 08/10/2022 90  79 - 97 fL Final    MCH 08/10/2022 30.5  26.6 - 33.0 pg Final    MCHC 08/10/2022 33.9  31.5 - 35.7 g/dL Final    RDW 08/10/2022 12.4  11.6 - 15.4 % Final    Platelets 08/10/2022 279  150 - 450 x10E3/uL Final    Neutrophil Rel % 08/10/2022 61  Not Estab. % Final    Lymphocyte Rel % 08/10/2022 29  Not Estab. % Final    Monocyte Rel % 08/10/2022 8  Not Estab. % Final    Eosinophil Rel % 08/10/2022 1  Not Estab. % Final    Basophil Rel % 08/10/2022 1  Not Estab. % Final    Neutrophils Absolute 08/10/2022 4.4  1.4 - 7.0 x10E3/uL Final    Lymphocytes Absolute 08/10/2022 2.1  0.7 - 3.1 x10E3/uL Final    Monocytes Absolute 08/10/2022 0.5  0.1 - 0.9 x10E3/uL Final    Eosinophils Absolute 08/10/2022 0.1  0.0 - 0.4 x10E3/uL Final    Basophils Absolute 08/10/2022 0.0  0.0 - 0.2 x10E3/uL Final    Immature Granulocyte Rel % 08/10/2022 0  Not Estab. % Final    Immature Grans Absolute 08/10/2022 0.0  0.0 - 0.1 x10E3/uL Final    TSH 08/10/2022 2.360  0.450 - 4.500 uIU/mL Final    Testosterone, Total 08/10/2022 442  264 - 916 ng/dL Final    Comment: Adult male reference interval is based on a population of  healthy nonobese males (BMI <30) between 19 and 39 years old.  Fabian et.al. JCEM 2017,102;6589-2324. PMID: 58847972.      Testosterone, Free 08/10/2022 15.1  9.3 - 26.5 pg/mL Final       Assessment & Plan   Diagnoses and all orders for this visit:    1. Major depressive disorder, recurrent episode, moderate (Primary)  -     prazosin (MINIPRESS) 2 MG  capsule; Take 1 capsule by mouth 2 (Two) Times a Day.  Dispense: 60 capsule; Refill: 2  -     buPROPion XL (WELLBUTRIN XL) 300 MG 24 hr tablet; Take 1 tablet by mouth Every Morning.  Dispense: 90 tablet; Refill: 3    2. Generalized anxiety disorder  -     prazosin (MINIPRESS) 2 MG capsule; Take 1 capsule by mouth 2 (Two) Times a Day.  Dispense: 60 capsule; Refill: 2        Assessment & Plan  1. Major depressive disorder  2. Generalized anxiety disorder   He reports an increase in anxiety-related intrusive thoughts over the past couple of months. Denies any suicidal ideation. He will be prescribed prazosin 2 mg twice daily for a month to manage his anxiety, with refills available. If this regimen proves ineffective, he will inform the provider, and the dosage of lamotrigine will be increased to 100 mg. A prescription for Wellbutrin will also be provided.    Follow-up  The patient will follow up in 6 months.       Continue bupropion XL 300mg daily in the morning.   Continue lamotrigine 75mg nightly.   Continue prazosin, increase to 2mg in the morning and 2mg at night.   Consider increasing lamotrigine to 100mg after that, if intrusive thoughts and anxiety are still not managed.     Visit Diagnoses:    ICD-10-CM ICD-9-CM   1. Major depressive disorder, recurrent episode, moderate  F33.1 296.32   2. Generalized anxiety disorder  F41.1 300.02           Pt history, review of systems, medications, allergies, reviewed, patient was screened today for depression/anxiety, PHQ/MU scores reviewed.  Most recent vitals/labs reviewed.  Pt was given appropriate time to ask questions and concerns were addressed. A thorough discussion was had that included review of disease process, need for continued monitoring and additional treatment options including use of pharmacological and non-pharmacological approaches to care, decisions were made and agreed upon by patient and provider.   Discussed the risks, benefits, and potential side  effects of the medications; patient ackowledged and verbally consented.     TREATMENT PLAN/GOALS: Continue supportive psychotherapy efforts and medications as indicated. Treatment and medication options discussed during today's visit. Patient ackowledged and verbally consented to continue with current treatment plan and was educated on the importance of compliance with treatment and follow-up appointments.    -Short-Term Goals: Patient will be compliant with medication management and note improvement in S/S over the next 4 to 6 weeks or at next scheduled visit.  -Long-Term Goals: Patient will be compliant with the agreed treatment plan including medication regimen & F/U appt's and deny impairment in daily functioning over the next 6 months.      CRISIS RESOURCES:    In the event you have personal crisis, there are several resources to reach someone to talk with:    468 Suicide and Crisis Lifeline  Call or text 721 or chat [address]org  [facility]'s National Helpline  2-435-738-HELP (4357)  Text your zip code to 468[address] (HELP4U)  's Crisis Line  Dial 558, then press 1  Text 122608    No show policy:  We understand unexpected circumstances arise; however, anytime you miss your appointment we are unable to provide you appropriate care.  In addition, each appointment missed could have been used to provide care for others.  We ask that you call at least 24 hours in advance to cancel or reschedule an appointment. We would like to take this opportunity to remind you of our policy stating patients who miss THREE appointments without cancelling or rescheduling 24 hours in advance of the appointment may be subject to cancellation of any further visits with our clinic. Please call [phone] to reschedule your appointment. If there are reasons that make it difficult for you to keep the appointments, please call and let us know how we can help. Please understand that medication prescribing will not continue without  seeing your provider.        MEDICATION ISSUES:  INSPECT reviewed as expected    Discussed medication options and treatment plan of prescribed medication as well as the risks, benefits, and side effects including potential falls, possible impaired driving and metabolic adversities among others. Patient is agreeable to call the office with any worsening of symptoms or onset of side effects. Patient is agreeable to call 911 or go to the nearest ER should he/she begin having SI/HI. No medication side effects or related complaints today.     MEDS ORDERED DURING VISIT:  New Medications Ordered This Visit   Medications    prazosin (MINIPRESS) 2 MG capsule     Sig: Take 1 capsule by mouth 2 (Two) Times a Day.     Dispense:  60 capsule     Refill:  2    buPROPion XL (WELLBUTRIN XL) 300 MG 24 hr tablet     Sig: Take 1 tablet by mouth Every Morning.     Dispense:  90 tablet     Refill:  3       Return in about 6 months (around 7/30/2025).         This document has been electronically signed by CANDI Garcia  January 30, 2025 22:54 EST    Part of this note may be an electronic transcription/translation of spoken language to printed text using the Dragon Dictation System. Some of the data in this electronic note has been brought forward from a previous encounter, any necessary changes have been made, it has been reviewed by this APRN, and it is accurate.

## 2025-02-28 DIAGNOSIS — F41.1 GENERALIZED ANXIETY DISORDER: Chronic | ICD-10-CM

## 2025-02-28 DIAGNOSIS — F33.1 MAJOR DEPRESSIVE DISORDER, RECURRENT EPISODE, MODERATE: Chronic | ICD-10-CM

## 2025-02-28 RX ORDER — PRAZOSIN HYDROCHLORIDE 2 MG/1
2 CAPSULE ORAL 2 TIMES DAILY
Qty: 60 CAPSULE | Refills: 2 | OUTPATIENT
Start: 2025-02-28

## 2025-05-25 DIAGNOSIS — F41.1 GENERALIZED ANXIETY DISORDER: Chronic | ICD-10-CM

## 2025-05-25 DIAGNOSIS — F33.1 MAJOR DEPRESSIVE DISORDER, RECURRENT EPISODE, MODERATE: Chronic | ICD-10-CM

## 2025-05-25 RX ORDER — PRAZOSIN HYDROCHLORIDE 2 MG/1
2 CAPSULE ORAL 2 TIMES DAILY
Qty: 180 CAPSULE | Refills: 0 | Status: SHIPPED | OUTPATIENT
Start: 2025-05-25

## 2025-07-24 DIAGNOSIS — F41.1 GENERALIZED ANXIETY DISORDER: Chronic | ICD-10-CM

## 2025-07-24 RX ORDER — LAMOTRIGINE 150 MG/1
75 TABLET ORAL
Qty: 45 TABLET | Refills: 3 | Status: SHIPPED | OUTPATIENT
Start: 2025-07-24

## 2025-07-24 NOTE — TELEPHONE ENCOUNTER
Rx Refill Note  Requested Prescriptions     Pending Prescriptions Disp Refills    lamoTRIgine (LaMICtal) 150 MG tablet [Pharmacy Med Name: LAMOTRIGINE 150MG TABLETS] 45 tablet 3     Sig: TAKE 1/2 TABLET BY MOUTH EVERY NIGHT AT BEDTIME        Last office visit with prescribing clinician: 1/30/2025     Next office visit with prescribing clinician: Visit date not found     Office Visit with Maryam Hernandez APRN (01/30/2025)     Dania Mendes  07/24/25, 08:07 EDT

## 2025-08-25 DIAGNOSIS — F33.1 MAJOR DEPRESSIVE DISORDER, RECURRENT EPISODE, MODERATE: Chronic | ICD-10-CM

## 2025-08-25 DIAGNOSIS — F41.1 GENERALIZED ANXIETY DISORDER: Chronic | ICD-10-CM

## 2025-08-25 DIAGNOSIS — K21.9 GASTROESOPHAGEAL REFLUX DISEASE: ICD-10-CM

## 2025-08-26 RX ORDER — PANTOPRAZOLE SODIUM 40 MG/1
40 TABLET, DELAYED RELEASE ORAL DAILY
Qty: 90 TABLET | Refills: 3 | OUTPATIENT
Start: 2025-08-26

## 2025-08-26 RX ORDER — PRAZOSIN HYDROCHLORIDE 2 MG/1
2 CAPSULE ORAL 2 TIMES DAILY
Qty: 180 CAPSULE | Refills: 0 | Status: SHIPPED | OUTPATIENT
Start: 2025-08-26

## (undated) DEVICE — SUT VIC 2/0 SH 27IN

## (undated) DEVICE — SKIN AFFIX SURG ADHESIVE 72/CS 0.55ML: Brand: MEDLINE

## (undated) DEVICE — GOWN,REINFORCE,POLY,SIRUS,BREATH SLV,XLG: Brand: MEDLINE

## (undated) DEVICE — PK MINOR LAPAROTOMY 50

## (undated) DEVICE — DECANTER: Brand: UNBRANDED

## (undated) DEVICE — GLV SURG SENSICARE SLT PF LF 8 STRL

## (undated) DEVICE — KT SURG TURNOVER 050

## (undated) DEVICE — UNDERGLV SURG BIOGEL/PI PF SYNTH SURG SZ8.5 BLU 50/BX

## (undated) DEVICE — UNDYED BRAIDED (POLYGLACTIN 910), SYNTHETIC ABSORBABLE SUTURE: Brand: COATED VICRYL

## (undated) DEVICE — SOL IRRIG H2O 1000ML STRL

## (undated) DEVICE — SUT VIC 3/0 SH 27IN J416H

## (undated) DEVICE — GOWN,REINFRCE,POLY,SIRUS,BREATH SLV,XXLG: Brand: MEDLINE

## (undated) DEVICE — PENCL EVAC ULTRAVAC SMOKE W/BLD